# Patient Record
Sex: MALE | Race: BLACK OR AFRICAN AMERICAN | Employment: FULL TIME | URBAN - METROPOLITAN AREA
[De-identification: names, ages, dates, MRNs, and addresses within clinical notes are randomized per-mention and may not be internally consistent; named-entity substitution may affect disease eponyms.]

---

## 2017-01-11 ENCOUNTER — TELEPHONE (OUTPATIENT)
Dept: FAMILY MEDICINE CLINIC | Facility: CLINIC | Age: 52
End: 2017-01-11

## 2017-01-11 RX ORDER — AMLODIPINE BESYLATE 10 MG/1
10 TABLET ORAL DAILY
Qty: 30 TABLET | Refills: 1 | Status: SHIPPED | OUTPATIENT
Start: 2017-01-11 | End: 2017-03-01

## 2017-01-11 NOTE — TELEPHONE ENCOUNTER
Requesting Amlodipine refill    Noted CMP from [facility] ER visit 11/17/16 scanned into EMR; Creatinine 1.31  Prescription refilled per FM refill protocol.     Hypertensive Medications  Protocol Criteria:  · Appointment scheduled in the

## 2017-01-23 ENCOUNTER — OFFICE VISIT (OUTPATIENT)
Dept: GASTROENTEROLOGY | Facility: CLINIC | Age: 52
End: 2017-01-23

## 2017-01-23 ENCOUNTER — TELEPHONE (OUTPATIENT)
Dept: GASTROENTEROLOGY | Facility: CLINIC | Age: 52
End: 2017-01-23

## 2017-01-23 VITALS
DIASTOLIC BLOOD PRESSURE: 86 MMHG | SYSTOLIC BLOOD PRESSURE: 136 MMHG | WEIGHT: 194 LBS | BODY MASS INDEX: 27 KG/M2 | HEART RATE: 67 BPM

## 2017-01-23 DIAGNOSIS — Z12.11 ENCOUNTER FOR SCREENING COLONOSCOPY: Primary | ICD-10-CM

## 2017-01-23 PROCEDURE — 99241 OFFICE CONSULTATION,LEVEL I: CPT | Performed by: INTERNAL MEDICINE

## 2017-01-23 PROCEDURE — 99212 OFFICE O/P EST SF 10 MIN: CPT | Performed by: INTERNAL MEDICINE

## 2017-01-23 RX ORDER — ALLOPURINOL 300 MG/1
TABLET ORAL
COMMUNITY
Start: 2016-06-13 | End: 2017-11-07

## 2017-01-23 RX ORDER — LOSARTAN POTASSIUM 50 MG/1
TABLET ORAL
COMMUNITY
Start: 2016-06-13 | End: 2018-12-03

## 2017-01-23 RX ORDER — AMLODIPINE BESYLATE 10 MG/1
TABLET ORAL
COMMUNITY
Start: 2016-07-27 | End: 2018-05-08

## 2017-01-23 NOTE — TELEPHONE ENCOUNTER
Scheduled for:  Colonoscopy 65492  Date:  03/20/17  Location:  [facility]  Sedation:  MAC  Time:  12:30pm  Prep: SUPREP  Meds/Allergies Reconciled?:  NKA  Diagnosis with codes:  Colon cancer screening Z12.11  [facility] verification:

## 2017-01-23 NOTE — PROGRESS NOTES
HPI:    Patient ID: Arnaldo Horn is a 46year old man with history of hernia surgery who is referred to us by Dr. Jesika Marie for his first screening colonoscopy examination. He is seen today with his wife.     On review of systems, Mr Jerson Raygoza describes recent 2014.    Suggest:    1. Await labs ordered November 2016, including PSA which we discussed today. He comes in asking about prostate cancer screening. 2.  Agree with abdominal ultrasound exam as ordered November 2016.   I urged Mr Modesto Norris to go complete

## 2017-01-24 RX ORDER — DILTIAZEM HYDROCHLORIDE 180 MG/1
CAPSULE, COATED, EXTENDED RELEASE ORAL
Qty: 90 CAPSULE | Refills: 0 | Status: SHIPPED | OUTPATIENT
Start: 2017-01-24 | End: 2017-06-05

## 2017-01-30 ENCOUNTER — TELEPHONE (OUTPATIENT)
Dept: FAMILY MEDICINE CLINIC | Facility: CLINIC | Age: 52
End: 2017-01-30

## 2017-01-30 NOTE — TELEPHONE ENCOUNTER
During that visit I gave him a note to return on 11/23, does he need FMLA forms or just a copy of that letter?

## 2017-01-30 NOTE — TELEPHONE ENCOUNTER
Dr. Mariel Caldwell please see note below and advise. LOV with you 11/19/16 [facility].

## 2017-01-30 NOTE — TELEPHONE ENCOUNTER
Patient calling states that he needs to submit to his [employer] ( for [employer])   He was off  November 17 th and Nove 18 th   And can return on Nov 22 nd

## 2017-01-30 NOTE — TELEPHONE ENCOUNTER
Spoke with pt stts he already Has FMLA going. He would like a letter to be excused from work November 17 and November 18th. RTW November 22. Please advise. Letter that was written on OV stated may not rtw.

## 2017-01-31 NOTE — TELEPHONE ENCOUNTER
Spoke with pt informed of note below and he voiced understanding. Pt will  letter at [address] [facility] .

## 2017-03-01 ENCOUNTER — TELEPHONE (OUTPATIENT)
Dept: FAMILY MEDICINE CLINIC | Facility: CLINIC | Age: 52
End: 2017-03-01

## 2017-03-01 RX ORDER — AMLODIPINE BESYLATE 10 MG/1
10 TABLET ORAL DAILY
Qty: 90 TABLET | Refills: 0 | Status: SHIPPED | OUTPATIENT
Start: 2017-03-01 | End: 2017-11-07

## 2017-03-01 NOTE — TELEPHONE ENCOUNTER
Requesting 90-day Amlodipine refill    Noted scanned lab results from [facility] ER visit, Creatinine 1.31    Prescription refilled per FM refill protocol    Hypertensive Medications  Protocol Criteria:  · Appointment scheduled in the p

## 2017-03-20 ENCOUNTER — ANESTHESIA EVENT (OUTPATIENT)
Dept: ENDOSCOPY | Facility: HOSPITAL | Age: 52
End: 2017-03-20
Payer: COMMERCIAL

## 2017-03-20 ENCOUNTER — ANESTHESIA (OUTPATIENT)
Dept: ENDOSCOPY | Facility: HOSPITAL | Age: 52
End: 2017-03-20
Payer: COMMERCIAL

## 2017-03-20 ENCOUNTER — SURGERY (OUTPATIENT)
Age: 52
End: 2017-03-20

## 2017-03-20 ENCOUNTER — HOSPITAL ENCOUNTER (OUTPATIENT)
Facility: HOSPITAL | Age: 52
Setting detail: HOSPITAL OUTPATIENT SURGERY
Discharge: HOME | End: 2017-03-20
Attending: INTERNAL MEDICINE | Admitting: INTERNAL MEDICINE
Payer: COMMERCIAL

## 2017-03-20 VITALS
DIASTOLIC BLOOD PRESSURE: 91 MMHG | HEIGHT: 70 IN | HEART RATE: 53 BPM | WEIGHT: 191 LBS | SYSTOLIC BLOOD PRESSURE: 137 MMHG | TEMPERATURE: 98 F | BODY MASS INDEX: 27.35 KG/M2 | OXYGEN SATURATION: 100 % | RESPIRATION RATE: 17 BRPM

## 2017-03-20 DIAGNOSIS — K63.5 COLON POLYP: Primary | ICD-10-CM

## 2017-03-20 DIAGNOSIS — K64.9 HEMORRHOIDS, UNSPECIFIED HEMORRHOID TYPE: ICD-10-CM

## 2017-03-20 PROCEDURE — 45385 COLONOSCOPY W/LESION REMOVAL: CPT | Performed by: INTERNAL MEDICINE

## 2017-03-20 PROCEDURE — 0DBK8ZX EXCISION OF ASCENDING COLON, VIA NATURAL OR ARTIFICIAL OPENING ENDOSCOPIC, DIAGNOSTIC: ICD-10-PCS | Performed by: INTERNAL MEDICINE

## 2017-03-20 PROCEDURE — 0DBN8ZX EXCISION OF SIGMOID COLON, VIA NATURAL OR ARTIFICIAL OPENING ENDOSCOPIC, DIAGNOSTIC: ICD-10-PCS | Performed by: INTERNAL MEDICINE

## 2017-03-20 RX ORDER — LIDOCAINE HYDROCHLORIDE 10 MG/ML
INJECTION, SOLUTION EPIDURAL; INFILTRATION; INTRACAUDAL; PERINEURAL AS NEEDED
Status: DISCONTINUED | OUTPATIENT
Start: 2017-03-20 | End: 2017-03-20 | Stop reason: SURG

## 2017-03-20 RX ORDER — MIDAZOLAM HYDROCHLORIDE 1 MG/ML
1 INJECTION INTRAMUSCULAR; INTRAVENOUS EVERY 5 MIN PRN
Status: DISCONTINUED | OUTPATIENT
Start: 2017-03-20 | End: 2017-03-20

## 2017-03-20 RX ORDER — MIDAZOLAM HYDROCHLORIDE 1 MG/ML
INJECTION INTRAMUSCULAR; INTRAVENOUS AS NEEDED
Status: DISCONTINUED | OUTPATIENT
Start: 2017-03-20 | End: 2017-03-20 | Stop reason: SURG

## 2017-03-20 RX ORDER — SODIUM CHLORIDE 9 MG/ML
INJECTION, SOLUTION INTRAVENOUS CONTINUOUS
Status: DISCONTINUED | OUTPATIENT
Start: 2017-03-20 | End: 2017-03-20

## 2017-03-20 RX ORDER — SODIUM CHLORIDE, SODIUM LACTATE, POTASSIUM CHLORIDE, CALCIUM CHLORIDE 600; 310; 30; 20 MG/100ML; MG/100ML; MG/100ML; MG/100ML
INJECTION, SOLUTION INTRAVENOUS CONTINUOUS PRN
Status: DISCONTINUED | OUTPATIENT
Start: 2017-03-20 | End: 2017-03-20 | Stop reason: SURG

## 2017-03-20 RX ORDER — SODIUM CHLORIDE 0.9 % (FLUSH) 0.9 %
10 SYRINGE (ML) INJECTION AS NEEDED
Status: DISCONTINUED | OUTPATIENT
Start: 2017-03-20 | End: 2017-03-20

## 2017-03-20 RX ADMIN — MIDAZOLAM HYDROCHLORIDE 2 MG: 1 INJECTION INTRAMUSCULAR; INTRAVENOUS at 13:24:00

## 2017-03-20 RX ADMIN — SODIUM CHLORIDE, SODIUM LACTATE, POTASSIUM CHLORIDE, CALCIUM CHLORIDE: 600; 310; 30; 20 INJECTION, SOLUTION INTRAVENOUS at 13:35:00

## 2017-03-20 RX ADMIN — SODIUM CHLORIDE, SODIUM LACTATE, POTASSIUM CHLORIDE, CALCIUM CHLORIDE: 600; 310; 30; 20 INJECTION, SOLUTION INTRAVENOUS at 13:18:00

## 2017-03-20 RX ADMIN — LIDOCAINE HYDROCHLORIDE 50 MG: 10 INJECTION, SOLUTION EPIDURAL; INFILTRATION; INTRACAUDAL; PERINEURAL at 13:25:00

## 2017-03-20 NOTE — OPERATIVE REPORT
COLONOSCOPY PROCEDURE REPORT     DATE OF PROCEDURE:  3/20/2017     PCP: Ricci Moya DO     PREOPERATIVE DIAGNOSIS: Screening colonoscopy examination     POSTOPERATIVE DIAGNOSIS:  See impression. SURGEON:  GAMALIEL Velasquez aspirin or NSAID medications for next 10 days to prevent bleeding

## 2017-03-20 NOTE — ANESTHESIA PREPROCEDURE EVALUATION
Anesthesia PreOp Note    HPI:     Gretchen Arvizu is a 46year old male who presents for preoperative consultation requested by: Miah Hilton MD    Date of Surgery: 3/20/2017    Procedure(s):  COLONOSCOPY  Indication: Special screening for malign per week         Comment: 2 beers a day and possibly scotch    Drug Use: No    Sexual Activity: Not on file   Not on file  Other Topics Concern   None on file     Social History Narrative       Available pre-op labs reviewed. Vital Signs:   Body

## 2017-03-20 NOTE — H&P
History & Physical Examination    Patient Name: John Espinoza  MRN: [id number]  CSN: [id number]  YOB: 1965    Diagnosis: Screening colonoscopy examination    Present Illness:       Lesley Rivera MD at 1/23/2017  9:53 AM      Status: Date   • Essential hypertension    • High blood pressure          Past Surgical History    HERNIA SURGERY      FOOT SURGERY Left 1999    Comment archilles tendon torn    ELBOW SURGERY Right 2010    Comment right elbow ligament torn      Family History   Pr

## 2017-03-20 NOTE — ANESTHESIA POSTPROCEDURE EVALUATION
Patient: Ayden Gandhi    Procedure Summary     Date Anesthesia Start Anesthesia Stop Room / Location    03/20/17 5640 [address] [facility] ENDOSCOPY 01 / [facility] ENDOSCOPY       Procedure Diagnosis Surgeon Responsible Provider    COLONOSCOPY (N/A ) Special screening for m

## 2017-03-27 ENCOUNTER — TELEPHONE (OUTPATIENT)
Dept: FAMILY MEDICINE CLINIC | Facility: CLINIC | Age: 52
End: 2017-03-27

## 2017-03-27 NOTE — TELEPHONE ENCOUNTER
Actions Requested: pt asking for ortho referral  Situation/Background   Problem: pt states he feels like he has a stiff neck and has a headache.     Onset: 2-3 months ago   Associated Symptoms: Pt states his range of motion is limited, has difficulty turnin

## 2017-04-03 ENCOUNTER — TELEPHONE (OUTPATIENT)
Dept: GASTROENTEROLOGY | Facility: CLINIC | Age: 52
End: 2017-04-03

## 2017-04-03 NOTE — TELEPHONE ENCOUNTER
Message        GI RNs - 1. Please print and mail this letter to patient; 2. Recall for colonoscopy exam in 3 years     Results letter mailed to patient and colon recall entered for 3 years per CB.

## 2017-05-08 ENCOUNTER — TELEPHONE (OUTPATIENT)
Dept: FAMILY MEDICINE CLINIC | Facility: CLINIC | Age: 52
End: 2017-05-08

## 2017-05-08 ENCOUNTER — OFFICE VISIT (OUTPATIENT)
Dept: FAMILY MEDICINE CLINIC | Facility: CLINIC | Age: 52
End: 2017-05-08

## 2017-05-08 VITALS
TEMPERATURE: 98 F | HEART RATE: 59 BPM | SYSTOLIC BLOOD PRESSURE: 112 MMHG | DIASTOLIC BLOOD PRESSURE: 78 MMHG | HEIGHT: 71 IN | RESPIRATION RATE: 16 BRPM | BODY MASS INDEX: 26.32 KG/M2 | WEIGHT: 188 LBS

## 2017-05-08 DIAGNOSIS — M99.01 CERVICOTHORACIC SOMATIC DYSFUNCTION: Primary | ICD-10-CM

## 2017-05-08 DIAGNOSIS — R06.4 HYPERVENTILATION: ICD-10-CM

## 2017-05-08 DIAGNOSIS — I10 ESSENTIAL HYPERTENSION: ICD-10-CM

## 2017-05-08 DIAGNOSIS — G47.00 INSOMNIA, UNSPECIFIED TYPE: ICD-10-CM

## 2017-05-08 DIAGNOSIS — H53.9 VISUAL DISTURBANCE: ICD-10-CM

## 2017-05-08 PROCEDURE — 98927 OSTEOPATH MANJ 5-6 REGIONS: CPT | Performed by: FAMILY MEDICINE

## 2017-05-08 PROCEDURE — 99214 OFFICE O/P EST MOD 30 MIN: CPT | Performed by: FAMILY MEDICINE

## 2017-05-08 PROCEDURE — 99212 OFFICE O/P EST SF 10 MIN: CPT | Performed by: FAMILY MEDICINE

## 2017-05-08 RX ORDER — ESZOPICLONE 2 MG/1
2 TABLET, FILM COATED ORAL NIGHTLY
Qty: 30 TABLET | Refills: 1 | Status: SHIPPED | OUTPATIENT
Start: 2017-05-08 | End: 2017-07-12

## 2017-05-08 RX ORDER — ZOLPIDEM TARTRATE 10 MG/1
10 TABLET ORAL NIGHTLY PRN
COMMUNITY
Start: 2017-03-26 | End: 2017-07-12

## 2017-05-08 NOTE — TELEPHONE ENCOUNTER
FMLA forms papers from [employer] sent to [facility] inter office mail and fax to [facility], pt had an appt today 05 08 2017 w Dr Chino Awad,

## 2017-05-09 RX ORDER — METHYLPREDNISOLONE 4 MG/1
TABLET ORAL
Qty: 1 KIT | Refills: 0 | Status: SHIPPED | OUTPATIENT
Start: 2017-05-09 | End: 2017-07-12 | Stop reason: ALTCHOICE

## 2017-05-09 NOTE — PROGRESS NOTES
HPI:    Patient ID: Diana Allan is a 46year old male. Neck Pain   This is a chronic problem. The current episode started more than 1 month ago. The problem occurs constantly. The problem has been gradually worsening.  The pain is associated with an unk methylPREDNISolone (MEDROL) 4 MG Oral Tablet Therapy Pack As directed. Disp: 1 kit Rfl: 0   Zolpidem Tartrate 10 MG Oral Tab Take 10 mg by mouth nightly as needed. Disp:  Rfl:    Eszopiclone 2 MG Oral Tab Take 1 tablet (2 mg total) by mouth nightly.  Disp: Neurological: He is alert and oriented to person, place, and time. No cranial nerve deficit, sensory deficit or motor deficit. ASSESSMENT/PLAN:   1.  Cervicothoracic somatic dysfunction  Moderate relief after manipulation performed.  - OSTEOPAT

## 2017-05-15 ENCOUNTER — HOSPITAL ENCOUNTER (OUTPATIENT)
Dept: GENERAL RADIOLOGY | Age: 52
Discharge: HOME | End: 2017-05-15
Attending: FAMILY MEDICINE
Payer: COMMERCIAL

## 2017-05-15 ENCOUNTER — OFFICE VISIT (OUTPATIENT)
Dept: FAMILY MEDICINE CLINIC | Facility: CLINIC | Age: 52
End: 2017-05-15

## 2017-05-15 VITALS
SYSTOLIC BLOOD PRESSURE: 124 MMHG | DIASTOLIC BLOOD PRESSURE: 78 MMHG | TEMPERATURE: 99 F | HEART RATE: 90 BPM | HEIGHT: 71 IN | RESPIRATION RATE: 16 BRPM | WEIGHT: 185 LBS | BODY MASS INDEX: 25.9 KG/M2

## 2017-05-15 DIAGNOSIS — R07.89 CHEST WALL DISCOMFORT: ICD-10-CM

## 2017-05-15 DIAGNOSIS — M99.01 CERVICOTHORACIC SOMATIC DYSFUNCTION: ICD-10-CM

## 2017-05-15 DIAGNOSIS — I10 ESSENTIAL HYPERTENSION: ICD-10-CM

## 2017-05-15 DIAGNOSIS — M54.2 CERVICAL PAIN (NECK): ICD-10-CM

## 2017-05-15 DIAGNOSIS — M54.2 CERVICAL PAIN (NECK): Primary | ICD-10-CM

## 2017-05-15 PROCEDURE — 98927 OSTEOPATH MANJ 5-6 REGIONS: CPT | Performed by: FAMILY MEDICINE

## 2017-05-15 PROCEDURE — 99212 OFFICE O/P EST SF 10 MIN: CPT | Performed by: FAMILY MEDICINE

## 2017-05-15 PROCEDURE — 72050 X-RAY EXAM NECK SPINE 4/5VWS: CPT | Performed by: FAMILY MEDICINE

## 2017-05-15 PROCEDURE — 99214 OFFICE O/P EST MOD 30 MIN: CPT | Performed by: FAMILY MEDICINE

## 2017-05-15 NOTE — PROGRESS NOTES
HPI:    Patient ID: Davina Pedro is a 46year old male. Neck Pain   This is a new problem. The current episode started 1 to 4 weeks ago. The problem has been unchanged. The pain is associated with an unknown factor.  The pain is present in the occipital Musculoskeletal: Positive for neck pain. Current Outpatient Prescriptions:  methylPREDNISolone (MEDROL) 4 MG Oral Tablet Therapy Pack As directed. Disp: 1 kit Rfl: 0   Zolpidem Tartrate 10 MG Oral Tab Take 10 mg by mouth nightly as needed.  Dis OSTEOPATHIC MANIP,5-6 BODY REGN      Orders Placed This Encounter  OSTEOPATHIC MANIP,5-6 BODY REGN    Meds This Visit:  No prescriptions requested or ordered in this encounter    Imaging & Referrals:  XR CERVICAL SPINE (4VIEWS) (CPT=72050)  Patient Instruc

## 2017-05-15 NOTE — PATIENT INSTRUCTIONS
Comply with medications. OMT done. Monitor blood pressures and record at home. Limit salt intake. Recommend weight loss via daily exercising and consistent healthy dietary changes.

## 2017-05-16 ENCOUNTER — TELEPHONE (OUTPATIENT)
Dept: FAMILY MEDICINE CLINIC | Facility: CLINIC | Age: 52
End: 2017-05-16

## 2017-05-16 NOTE — TELEPHONE ENCOUNTER
----- Message from Samantha Cohen DO sent at 5/15/2017  8:26 PM CDT -----  Arthritis noted in neck and some narrowing of nerve tunnels to the right.  Recommend ortho/spine for further evaluation if symptoms persist.

## 2017-06-06 RX ORDER — DILTIAZEM HYDROCHLORIDE 180 MG/1
CAPSULE, COATED, EXTENDED RELEASE ORAL
Qty: 90 CAPSULE | Refills: 0 | Status: SHIPPED | OUTPATIENT
Start: 2017-06-06 | End: 2017-09-07

## 2017-07-11 ENCOUNTER — TELEPHONE (OUTPATIENT)
Dept: FAMILY MEDICINE CLINIC | Facility: CLINIC | Age: 52
End: 2017-07-11

## 2017-07-11 NOTE — TELEPHONE ENCOUNTER
Actions Requested: pt asking to schedule appt with Dr. Bhupendra Rapp. States he has been feeling a sensation in his stomach and then has difficulty breathing and then he gets chest pain. States this may last for several hours.  Pt states he did see the doctor fo verbalizes understanding and agrees with plan.

## 2017-07-12 ENCOUNTER — LAB ENCOUNTER (OUTPATIENT)
Dept: LAB | Age: 52
End: 2017-07-12
Attending: FAMILY MEDICINE
Payer: COMMERCIAL

## 2017-07-12 ENCOUNTER — OFFICE VISIT (OUTPATIENT)
Dept: FAMILY MEDICINE CLINIC | Facility: CLINIC | Age: 52
End: 2017-07-12

## 2017-07-12 ENCOUNTER — HOSPITAL ENCOUNTER (OUTPATIENT)
Dept: GENERAL RADIOLOGY | Age: 52
Discharge: HOME | End: 2017-07-12
Attending: FAMILY MEDICINE
Payer: COMMERCIAL

## 2017-07-12 VITALS
HEART RATE: 75 BPM | OXYGEN SATURATION: 97 % | SYSTOLIC BLOOD PRESSURE: 111 MMHG | HEIGHT: 71 IN | RESPIRATION RATE: 16 BRPM | DIASTOLIC BLOOD PRESSURE: 81 MMHG | TEMPERATURE: 99 F | BODY MASS INDEX: 25.9 KG/M2 | WEIGHT: 185 LBS

## 2017-07-12 DIAGNOSIS — R35.0 URINARY FREQUENCY: ICD-10-CM

## 2017-07-12 DIAGNOSIS — R07.9 CHEST PAIN, UNSPECIFIED TYPE: Primary | ICD-10-CM

## 2017-07-12 DIAGNOSIS — R07.9 CHEST PAIN, UNSPECIFIED TYPE: ICD-10-CM

## 2017-07-12 DIAGNOSIS — G47.00 INSOMNIA, UNSPECIFIED TYPE: ICD-10-CM

## 2017-07-12 DIAGNOSIS — R06.02 SHORTNESS OF BREATH: ICD-10-CM

## 2017-07-12 LAB
BASOPHILS # BLD: 0.1 K/UL (ref 0–0.2)
BASOPHILS NFR BLD: 1 %
D DIMER PPP FEU-MCNC: <0.27 MCG/ML (ref ?–0.51)
EOSINOPHIL # BLD: 0.3 K/UL (ref 0–0.7)
EOSINOPHIL NFR BLD: 5 %
ERYTHROCYTE [DISTWIDTH] IN BLOOD BY AUTOMATED COUNT: 14.3 % (ref 11–15)
HCT VFR BLD AUTO: 49.2 % (ref 41–52)
HGB BLD-MCNC: 16.6 G/DL (ref 13.5–17.5)
LYMPHOCYTES # BLD: 1.8 K/UL (ref 1–4)
LYMPHOCYTES NFR BLD: 34 %
MCH RBC QN AUTO: 31 PG (ref 27–32)
MCHC RBC AUTO-ENTMCNC: 33.8 G/DL (ref 32–37)
MCV RBC AUTO: 91.7 FL (ref 80–100)
MONOCYTES # BLD: 0.7 K/UL (ref 0–1)
MONOCYTES NFR BLD: 14 %
NEUTROPHILS # BLD AUTO: 2.3 K/UL (ref 1.8–7.7)
NEUTROPHILS NFR BLD: 45 %
PLATELET # BLD AUTO: 126 K/UL (ref 140–400)
PMV BLD AUTO: 9.3 FL (ref 7.4–10.3)
RBC # BLD AUTO: 5.37 M/UL (ref 4.5–5.9)
WBC # BLD AUTO: 5.1 K/UL (ref 4–11)

## 2017-07-12 PROCEDURE — 85025 COMPLETE CBC W/AUTO DIFF WBC: CPT

## 2017-07-12 PROCEDURE — 36415 COLL VENOUS BLD VENIPUNCTURE: CPT

## 2017-07-12 PROCEDURE — 83036 HEMOGLOBIN GLYCOSYLATED A1C: CPT

## 2017-07-12 PROCEDURE — 99212 OFFICE O/P EST SF 10 MIN: CPT | Performed by: FAMILY MEDICINE

## 2017-07-12 PROCEDURE — 71010 XR CHEST AP/PA (1 VIEW) (CPT=71010): CPT | Performed by: FAMILY MEDICINE

## 2017-07-12 PROCEDURE — 85379 FIBRIN DEGRADATION QUANT: CPT

## 2017-07-12 PROCEDURE — 87086 URINE CULTURE/COLONY COUNT: CPT

## 2017-07-12 PROCEDURE — 99214 OFFICE O/P EST MOD 30 MIN: CPT | Performed by: FAMILY MEDICINE

## 2017-07-12 RX ORDER — ZOLPIDEM TARTRATE 10 MG/1
10 TABLET ORAL NIGHTLY PRN
Qty: 90 TABLET | Refills: 0 | Status: SHIPPED | OUTPATIENT
Start: 2017-07-12 | End: 2017-11-07

## 2017-07-12 NOTE — PROGRESS NOTES
HPI:    Arnaldo Horn is a 46year old male presents to clinic with a 2 week history of intermittent CP, SOB, and dizziness.  States that he initially thought it was heart burn which he has had in the past so he improved his diet and took antacids, this di tablet (10 mg total) by mouth nightly as needed.  Disp: 90 tablet Rfl: 0   DILTIAZEM HCL ER COATED BEADS 180 MG Oral Capsule SR 24 Hr TAKE 1 CAPSULE BY MOUTH DAILY, Disp: 90 capsule Rfl: 0   AmLODIPine Besylate 10 MG Oral Tab Take 1 tablet (10 mg total) by Psychiatric: Affect normal.   Vitals reviewed.     ASSESSMENT/PLAN:   Chest pain, unspecified type  (primary encounter diagnosis)  Shortness of breath  - symptoms sound like patient has anxiety, he would like to rule out all medical causes of this first

## 2017-07-13 LAB — HBA1C MFR BLD: 5.8 % (ref 4–6)

## 2017-07-17 ENCOUNTER — TELEPHONE (OUTPATIENT)
Dept: FAMILY MEDICINE CLINIC | Facility: CLINIC | Age: 52
End: 2017-07-17

## 2017-07-17 NOTE — TELEPHONE ENCOUNTER
Called patient to inform him of results. He did not answer so a VM was left asking him to call back, when he does, please inform him of the following:  Normal CXR, CBC, and neg Ddimer. Also Urine Culture was negative.  He is prediabetic, which explains his

## 2017-07-18 NOTE — TELEPHONE ENCOUNTER
Call transferred to RN from [address] [place] [address]. Pt had further questions about his lab results, reviewed doctor's note with pt. Per his request diet information from Eduar Martínez mailed to home address on file.  Advised pt to avoid sugary drinks including energy drinks and to r

## 2017-07-18 NOTE — TELEPHONE ENCOUNTER
Verified pt name and . Reviewed test results and recommendations with pt per doctor's instructions. Pt agreed with plan of care. No appt available this week, only same day appt on Saturday.     Please advise

## 2017-07-22 PROBLEM — F41.9 ANXIETY: Status: ACTIVE | Noted: 2017-07-22

## 2017-07-22 NOTE — PATIENT INSTRUCTIONS
Consider Dr Valetne Shaffer. Consider low dose ativan (0.5 mg) orally as needed. Consider GI work up.

## 2017-07-22 NOTE — PROGRESS NOTES
HPI:    Patient ID: Desiree Abrams is a 46year old male. Anxiety   This is a chronic problem. The current episode started more than 1 month ago. The problem occurs intermittently. The problem has been waxing and waning.  The symptoms are aggravated by str Anxiety  Recommend Dr. Ad Steele in psychotherapy. No orders of the defined types were placed in this encounter.       Meds This Visit:  No prescriptions requested or ordered in this encounter    Imaging & Referrals:  None  Patient Instructions   Consider

## 2017-09-08 RX ORDER — DILTIAZEM HYDROCHLORIDE 180 MG/1
CAPSULE, COATED, EXTENDED RELEASE ORAL
Qty: 90 CAPSULE | Refills: 0 | Status: SHIPPED | OUTPATIENT
Start: 2017-09-08 | End: 2018-12-03

## 2017-09-11 RX ORDER — ALLOPURINOL 300 MG/1
TABLET ORAL
Qty: 90 TABLET | Refills: 0 | Status: SHIPPED | OUTPATIENT
Start: 2017-09-11 | End: 2017-12-08

## 2017-10-24 RX ORDER — AMLODIPINE BESYLATE 10 MG/1
10 TABLET ORAL DAILY
Qty: 90 TABLET | Refills: 0 | Status: SHIPPED | OUTPATIENT
Start: 2017-10-24 | End: 2017-11-07

## 2017-11-07 ENCOUNTER — TELEPHONE (OUTPATIENT)
Dept: FAMILY MEDICINE CLINIC | Facility: CLINIC | Age: 52
End: 2017-11-07

## 2017-11-07 ENCOUNTER — OFFICE VISIT (OUTPATIENT)
Dept: FAMILY MEDICINE CLINIC | Facility: CLINIC | Age: 52
End: 2017-11-07

## 2017-11-07 VITALS
HEART RATE: 63 BPM | WEIGHT: 185.81 LBS | BODY MASS INDEX: 26.01 KG/M2 | TEMPERATURE: 99 F | HEIGHT: 71 IN | RESPIRATION RATE: 18 BRPM | SYSTOLIC BLOOD PRESSURE: 110 MMHG | DIASTOLIC BLOOD PRESSURE: 80 MMHG

## 2017-11-07 DIAGNOSIS — F41.9 ANXIETY: ICD-10-CM

## 2017-11-07 DIAGNOSIS — M54.2 CERVICAL PAIN (NECK): ICD-10-CM

## 2017-11-07 DIAGNOSIS — I10 ESSENTIAL HYPERTENSION: Primary | ICD-10-CM

## 2017-11-07 DIAGNOSIS — M10.9 GOUT, UNSPECIFIED CAUSE, UNSPECIFIED CHRONICITY, UNSPECIFIED SITE: ICD-10-CM

## 2017-11-07 PROCEDURE — 99212 OFFICE O/P EST SF 10 MIN: CPT | Performed by: FAMILY MEDICINE

## 2017-11-07 PROCEDURE — 99214 OFFICE O/P EST MOD 30 MIN: CPT | Performed by: FAMILY MEDICINE

## 2017-11-07 RX ORDER — ALPRAZOLAM 0.5 MG/1
0.5 TABLET ORAL 2 TIMES DAILY PRN
Qty: 60 TABLET | Refills: 0 | Status: SHIPPED | OUTPATIENT
Start: 2017-11-07 | End: 2018-12-03 | Stop reason: DRUGHIGH

## 2017-11-07 RX ORDER — ZOLPIDEM TARTRATE 10 MG/1
10 TABLET ORAL NIGHTLY PRN
Qty: 90 TABLET | Refills: 1 | Status: SHIPPED | OUTPATIENT
Start: 2017-11-07 | End: 2018-04-16

## 2017-11-07 NOTE — TELEPHONE ENCOUNTER
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 Logged for processing. Pt paid $25 with .  NK

## 2017-11-07 NOTE — PROGRESS NOTES
HPI:    Patient ID: Giorgio Bradford is a 46year old male. LA illness status update needed as well regarding gout. Hypertension   This is a chronic problem. The current episode started more than 1 year ago.  The problem has been gradually improving s 24 Hr TAKE ONE CAPSULE BY MOUTH EVERY DAY Disp: 90 capsule Rfl: 0   LORazepam (ATIVAN) 0.5 MG Oral Tab Take 1 tablet (0.5 mg total) by mouth 2 (two) times daily.  Disp: 60 tablet Rfl: 0   Zolpidem Tartrate 10 MG Oral Tab Take 1 tablet (10 mg total) by mouth blood pressures and record at home. Limit salt intake. Comply with medications. Recommend daily exercising and consistent healthy dietary changes. Medication reviewed and renewed where needed and appropriate.       Return in about 3 months (around 2/7/20

## 2017-11-07 NOTE — PATIENT INSTRUCTIONS
Monitor blood pressures and record at home. Limit salt intake. Comply with medications. Recommend daily exercising and consistent healthy dietary changes. Medication reviewed and renewed where needed and appropriate.

## 2017-11-07 NOTE — TELEPHONE ENCOUNTER
Pt had an appt today with Dr. Pradeep Hughes. FMLA forms, signed [employer] form and Form Completion Request emailed to the forms department.

## 2017-11-15 NOTE — TELEPHONE ENCOUNTER
Dr. Kyung Hunt,    Please sign off on form:  -Highlight the patient and hit \"Chart\" button. -In Chart Review, w/in the Encounter tab - open the Telephone call encounter for 11/07/17. Scroll down.  -Click \"scan on\" blue Hyperlink under \"Media\" heading for  PP[employer] FMLA Dr. Kyung Hunt 62/66/78 .  -Click on Acknowledge button and left-click onto image, signature stamp appears and drag signature to Provider signature line. Stamp will turn blue. Close window.     Thank you,  Guillermina Cover dept

## 2017-11-17 NOTE — TELEPHONE ENCOUNTER
Form completed and fxd and mld to Baker Sawyer [facility],  Copy mld to pt.   Pt paid fee BARBY

## 2017-12-08 RX ORDER — ALLOPURINOL 300 MG/1
TABLET ORAL
Qty: 90 TABLET | Refills: 0 | Status: SHIPPED | OUTPATIENT
Start: 2017-12-08 | End: 2018-03-31

## 2018-01-22 RX ORDER — AMLODIPINE BESYLATE 10 MG/1
TABLET ORAL
Qty: 90 TABLET | Refills: 0 | Status: SHIPPED | OUTPATIENT
Start: 2018-01-22 | End: 2018-07-15

## 2018-01-22 NOTE — TELEPHONE ENCOUNTER
Refill protocol failed, labs out of range.   FJR please advise on refill request.    Hypertensive Medications  Protocol Criteria:  · Appointment scheduled in the past 6 months or in the next 3 months  · BMP or CMP in the past 12 months  · Creatinine result

## 2018-03-31 RX ORDER — ALLOPURINOL 300 MG/1
TABLET ORAL
Qty: 90 TABLET | Refills: 0 | Status: SHIPPED | OUTPATIENT
Start: 2018-03-31 | End: 2018-06-30

## 2018-03-31 NOTE — TELEPHONE ENCOUNTER
LOV: 11/7/17 LAst Rx: 12/8/17    No protocol     Please advise in regards to refill request. Thank You

## 2018-04-16 NOTE — TELEPHONE ENCOUNTER
Please advise on refill request.     Recent Outpatient Visits            5 months ago Essential hypertension    [facility], [facility], [address], [place], [place] Phelps,     Office Visit    8 months ago [facility], [place]

## 2018-04-17 RX ORDER — ZOLPIDEM TARTRATE 10 MG/1
TABLET ORAL
Qty: 90 TABLET | Refills: 1 | OUTPATIENT
Start: 2018-04-17 | End: 2018-11-20

## 2018-04-18 ENCOUNTER — TELEPHONE (OUTPATIENT)
Dept: FAMILY MEDICINE CLINIC | Facility: CLINIC | Age: 53
End: 2018-04-18

## 2018-04-18 NOTE — TELEPHONE ENCOUNTER
Patient made an appointment with Dr. Landon Powell on Friday, April 20th at 10:30am for FMLA papers to be completed. Dr. Landon Powell is not the patient's PCP and she is not comfortable completing these forms. Please call patient and reschedule with Dr. Tricia Duran.

## 2018-04-18 NOTE — TELEPHONE ENCOUNTER
Patient needs Appointment to have FMLA paperwork filled out and needs to see Dr. Austin Vega due to had appointment with Dr. Yanet Rodriguez but she does not feel comfortable completing paperwork.      Can this patient be double booked on Friday 04/20/2018    Please

## 2018-04-19 NOTE — TELEPHONE ENCOUNTER
Appt was made for the next available date May 8th. Patient was transferred to the forms department regarding his FMLA paperwork.

## 2018-04-21 RX ORDER — ZOLPIDEM TARTRATE 10 MG/1
TABLET ORAL
Qty: 90 TABLET | Refills: 1 | OUTPATIENT
Start: 2018-04-21

## 2018-04-21 NOTE — TELEPHONE ENCOUNTER
Authorizing Provider Encounter Provider   Ruth Thomas, DO Ruth Thomas, DO   Medication Detail     Medication Quantity Refills Start End   ZOLPIDEM TARTRATE 10 MG Oral Tab 90 tablet 1 4/17/2018    Sig :  TAKE 1 TABLET BY MOUTH NIGHTLY AS NEE

## 2018-04-21 NOTE — TELEPHONE ENCOUNTER
Spoke with pharmacy and was informed that the patient is not eligible for refill until May 6, 2018. Informed Rx request came through to us electronically through [address] [place], [place] [address]way not requested by patient. Verbalized understanding.      No further questions or

## 2018-05-08 NOTE — PATIENT INSTRUCTIONS
Medication reviewed and renewed where needed and appropriate. Comply with medications. Monitor blood pressures and record at home. Limit salt intake. Recommend daily exercising and consistent healthy dietary changes. FMLA update regarding gout.

## 2018-05-08 NOTE — PROGRESS NOTES
HPI:    Patient ID: Espinoza Avelar is a 46year old male. Gout   This is a chronic problem. The current episode started more than 1 year ago. The problem occurs constantly. The problem has been unchanged. Pertinent negatives include no headaches.  Sulaiman Dominguez 180 MG Oral Capsule SR 24 Hr TAKE ONE CAPSULE BY MOUTH EVERY DAY Disp: 90 capsule Rfl: 0   LORazepam (ATIVAN) 0.5 MG Oral Tab Take 1 tablet (0.5 mg total) by mouth 2 (two) times daily.  Disp: 60 tablet Rfl: 0   colchicine 0.6 MG Oral Tab Take 1 tablet (0.6 changes. FMLA update regarding gout. Return in about 3 months (around 8/8/2018), or if symptoms worsen or fail to improve.          DM#[id number]

## 2018-05-09 ENCOUNTER — TELEPHONE (OUTPATIENT)
Dept: ADMINISTRATIVE | Age: 53
End: 2018-05-09

## 2018-05-09 NOTE — TELEPHONE ENCOUNTER
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 Logged for processing.  NK

## 2018-05-09 NOTE — TELEPHONE ENCOUNTER
Dr. Feliciano Gandhi,     Re certification LA (1 to 3 days per mo)   #1. Gout  #2. Hypertension H/A's       Please sign off on form:  -Highlight the patient and hit \"Chart\" button.   -In Chart Review, w/in the Encounter tab - click 1 time on the Telephone zuleyka

## 2018-06-29 ENCOUNTER — TELEPHONE (OUTPATIENT)
Dept: OTHER | Age: 53
End: 2018-06-29

## 2018-06-29 NOTE — TELEPHONE ENCOUNTER
Dr Kathrin Duran, please advise. Patient experienced flare up of his right ankle on Tuesday 6/26/18, still painful, using crutches.  He needs a note to excuse him from work on Saturday 6/30/18 as it is one day outside of his 3-day flare on his FMLA with [employer]

## 2018-07-02 RX ORDER — ALLOPURINOL 300 MG/1
TABLET ORAL
Qty: 90 TABLET | Refills: 0 | Status: SHIPPED | OUTPATIENT
Start: 2018-07-02 | End: 2020-08-11 | Stop reason: ALTCHOICE

## 2018-07-16 RX ORDER — AMLODIPINE BESYLATE 10 MG/1
TABLET ORAL
Qty: 90 TABLET | Refills: 0 | Status: SHIPPED | OUTPATIENT
Start: 2018-07-16 | End: 2018-12-03

## 2018-11-20 ENCOUNTER — TELEPHONE (OUTPATIENT)
Dept: FAMILY MEDICINE CLINIC | Facility: CLINIC | Age: 53
End: 2018-11-20

## 2018-11-20 NOTE — TELEPHONE ENCOUNTER
Charley Calderon needs a refill of:      ALPRAZolam (XANAX) 1 MG Oral Tab Take 1 tablet (1 mg total) by mouth nightly as needed for Sleep.  Disp: 60 tablet Rfl: 1   ZOLPIDEM TARTRATE 10 MG Oral Tab TAKE 1 TABLET BY MOUTH NIGHTLY AS NEEDED Disp: 90 tablet Rfl: 1

## 2018-11-20 NOTE — TELEPHONE ENCOUNTER
Please advise on refill request.     Refill Protocol Appointment Criteria  · Appointment scheduled in the past 6 months or in the next 3 months  Recent Outpatient Visits            6 months ago [address], [place], [place]

## 2018-11-20 NOTE — TELEPHONE ENCOUNTER
Fina Pearson stopped in the [facility] office, dropped off 2 sets of FMLA forms for recertification, one for hypertension, one for gout. Both sets of FMLA forms, completed HIPAA form and payment form (pt paid $41 for recertification) emailed to [email or web address], original forms left in the [facility] office for [employer].

## 2018-11-21 RX ORDER — ALPRAZOLAM 1 MG/1
1 TABLET ORAL NIGHTLY PRN
Qty: 60 TABLET | Refills: 1 | Status: SHIPPED
Start: 2018-11-21 | End: 2018-12-03

## 2018-11-21 RX ORDER — ZOLPIDEM TARTRATE 10 MG/1
TABLET ORAL
Qty: 90 TABLET | Refills: 1 | Status: SHIPPED
Start: 2018-11-21 | End: 2018-12-03

## 2018-11-21 NOTE — TELEPHONE ENCOUNTER
[facility], pharmacy called. Spoke to Dari, she called to confirm our office faxed over alprazolam and zolpidem. Confirmed RX.

## 2018-11-21 NOTE — TELEPHONE ENCOUNTER
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 Logged for processing.  NK

## 2018-11-27 NOTE — TELEPHONE ENCOUNTER
Forms (FMLA reserts  X 2)  completed signed b y Dr. Madiha Johnson and faxed.   Origs mld to pt he did pay $15.00 however he requested for the 2 dx to be  so I billed for form #2 $15.00 BARBY       FMLA #1  Hypertension H/A's   FMLA #2  Gout     Dr. Madiha Johnson

## 2018-12-03 ENCOUNTER — OFFICE VISIT (OUTPATIENT)
Dept: FAMILY MEDICINE CLINIC | Facility: CLINIC | Age: 53
End: 2018-12-03
Payer: COMMERCIAL

## 2018-12-03 VITALS
SYSTOLIC BLOOD PRESSURE: 134 MMHG | WEIGHT: 192 LBS | DIASTOLIC BLOOD PRESSURE: 83 MMHG | BODY MASS INDEX: 26.88 KG/M2 | HEART RATE: 88 BPM | TEMPERATURE: 98 F | RESPIRATION RATE: 16 BRPM | HEIGHT: 71 IN

## 2018-12-03 DIAGNOSIS — M1A.9XX0 CHRONIC GOUT WITHOUT TOPHUS, UNSPECIFIED CAUSE, UNSPECIFIED SITE: ICD-10-CM

## 2018-12-03 DIAGNOSIS — F41.9 ANXIETY: ICD-10-CM

## 2018-12-03 DIAGNOSIS — Z00.00 ANNUAL PHYSICAL EXAM: Primary | ICD-10-CM

## 2018-12-03 DIAGNOSIS — I10 ESSENTIAL HYPERTENSION: ICD-10-CM

## 2018-12-03 PROCEDURE — 99396 PREV VISIT EST AGE 40-64: CPT | Performed by: FAMILY MEDICINE

## 2018-12-03 RX ORDER — AMLODIPINE BESYLATE 10 MG/1
10 TABLET ORAL
Qty: 90 TABLET | Refills: 1 | Status: SHIPPED | OUTPATIENT
Start: 2018-12-03 | End: 2020-03-28

## 2018-12-03 RX ORDER — ZOLPIDEM TARTRATE 10 MG/1
TABLET ORAL
Qty: 90 TABLET | Refills: 1 | Status: SHIPPED | OUTPATIENT
Start: 2018-12-03 | End: 2019-06-27

## 2018-12-03 RX ORDER — DILTIAZEM HYDROCHLORIDE 180 MG/1
180 CAPSULE, COATED, EXTENDED RELEASE ORAL
Qty: 90 CAPSULE | Refills: 1 | Status: SHIPPED | OUTPATIENT
Start: 2018-12-03 | End: 2019-06-04

## 2018-12-03 RX ORDER — ALPRAZOLAM 1 MG/1
1 TABLET ORAL NIGHTLY PRN
Qty: 60 TABLET | Refills: 1 | Status: SHIPPED | OUTPATIENT
Start: 2018-12-03 | End: 2019-05-21

## 2018-12-03 NOTE — PROGRESS NOTES
HPI:    Cassandra Núñez is a 48year old male presents to clinic for an annual physical exam.   Denies any concerns or major changes. Is still experiencing symptoms of intermittent anxiety, notes that Xanax helps.   Has not been able to establish with a the MOUTH EVERY DAY Disp: 90 tablet Rfl: 0   ALLOPURINOL 300 MG Oral Tab TAKE 1 TABLET BY MOUTH EVERY DAY Disp: 90 tablet Rfl: 0   DILTIAZEM HCL ER COATED BEADS 180 MG Oral Capsule SR 24 Hr TAKE ONE CAPSULE BY MOUTH EVERY DAY Disp: 90 capsule Rfl: 0   colchici METABOLIC PANEL (14), TSH W REFLEX TO FREE T4,         LIPID PANEL, HEMOGLOBIN A1C, PSA  - Immunizations UTD, declined flu vaccine. - Smoking cessation advised. - Reinforced healthy diet, lifestyle, and exercise.   - Dentist visits Q6 months advised

## 2018-12-04 ENCOUNTER — TELEPHONE (OUTPATIENT)
Dept: FAMILY MEDICINE CLINIC | Facility: CLINIC | Age: 53
End: 2018-12-04

## 2018-12-04 DIAGNOSIS — D75.1 POLYCYTHEMIA: Primary | ICD-10-CM

## 2018-12-04 NOTE — TELEPHONE ENCOUNTER
Spoke to patient this AM regarding results. Mild elevation of AST, will repeat at next visit. Also, triglycerides are high, patient was not fasting, ate pizza prior to appt.  Will repeat fasting, I did encourage patient to clean up his diet and exercise mor

## 2018-12-10 ENCOUNTER — OFFICE VISIT (OUTPATIENT)
Dept: HEMATOLOGY/ONCOLOGY | Facility: HOSPITAL | Age: 53
End: 2018-12-10
Attending: INTERNAL MEDICINE
Payer: COMMERCIAL

## 2018-12-10 VITALS
WEIGHT: 189 LBS | DIASTOLIC BLOOD PRESSURE: 80 MMHG | SYSTOLIC BLOOD PRESSURE: 141 MMHG | HEIGHT: 71 IN | RESPIRATION RATE: 16 BRPM | BODY MASS INDEX: 26.46 KG/M2 | TEMPERATURE: 99 F | HEART RATE: 83 BPM

## 2018-12-10 DIAGNOSIS — D75.1 ERYTHROCYTOSIS: Primary | ICD-10-CM

## 2018-12-10 PROCEDURE — 99244 OFF/OP CNSLTJ NEW/EST MOD 40: CPT | Performed by: INTERNAL MEDICINE

## 2018-12-10 NOTE — CONSULTS
[facility]    PATIENT'S NAME: EYAD Muhammad   CONSULTING PHYSICIAN: Jeffrey Hoover.  Esperanza Walters MD   PATIENT ACCOUNT #: [de-identified] LOCATION: [address] [place] [address] RECORD #: [id number] YOB: 1965   CONSULTATION DATE: 12/10/2018       CANCER CENTER nontender, nondistended. No masses. No organomegaly. EXTREMITIES:  No edema. NEUROLOGIC:  Moves all extremities. Cranial nerves intact. SKIN:  No palpable lesions. LYMPHATICS:  No palpable peripheral adenopathy on survey.   PSYCHIATRIC:  Appropriat

## 2019-01-14 ENCOUNTER — LAB ENCOUNTER (OUTPATIENT)
Dept: LAB | Age: 54
End: 2019-01-14
Attending: INTERNAL MEDICINE
Payer: COMMERCIAL

## 2019-01-14 DIAGNOSIS — D75.1 ERYTHROCYTOSIS: ICD-10-CM

## 2019-01-14 LAB
BASOPHILS # BLD: 0.1 K/UL (ref 0–0.2)
BASOPHILS NFR BLD: 1 %
EOSINOPHIL # BLD: 0.5 K/UL (ref 0–0.7)
EOSINOPHIL NFR BLD: 8 %
ERYTHROCYTE [DISTWIDTH] IN BLOOD BY AUTOMATED COUNT: 14.1 % (ref 11–15)
HCT VFR BLD AUTO: 48.6 % (ref 41–52)
HGB BLD-MCNC: 16.5 G/DL (ref 13.5–17.5)
LYMPHOCYTES # BLD: 2.2 K/UL (ref 1–4)
LYMPHOCYTES NFR BLD: 37 %
MCH RBC QN AUTO: 31.1 PG (ref 27–32)
MCHC RBC AUTO-ENTMCNC: 33.9 G/DL (ref 32–37)
MCV RBC AUTO: 91.8 FL (ref 80–100)
MONOCYTES # BLD: 0.8 K/UL (ref 0–1)
MONOCYTES NFR BLD: 13 %
NEUTROPHILS # BLD AUTO: 2.4 K/UL (ref 1.8–7.7)
NEUTROPHILS NFR BLD: 40 %
PLATELET # BLD AUTO: 128 K/UL (ref 140–400)
PMV BLD AUTO: 8.8 FL (ref 7.4–10.3)
RBC # BLD AUTO: 5.29 M/UL (ref 4.5–5.9)
WBC # BLD AUTO: 5.9 K/UL (ref 4–11)

## 2019-01-14 PROCEDURE — 85025 COMPLETE CBC W/AUTO DIFF WBC: CPT

## 2019-01-14 PROCEDURE — 36415 COLL VENOUS BLD VENIPUNCTURE: CPT

## 2019-01-14 PROCEDURE — 82668 ASSAY OF ERYTHROPOIETIN: CPT

## 2019-01-17 LAB — ERYTHROPOIETIN (EPO): 6 MU/ML

## 2019-01-21 ENCOUNTER — APPOINTMENT (OUTPATIENT)
Dept: HEMATOLOGY/ONCOLOGY | Facility: HOSPITAL | Age: 54
End: 2019-01-21
Attending: INTERNAL MEDICINE
Payer: COMMERCIAL

## 2019-01-28 ENCOUNTER — TELEPHONE (OUTPATIENT)
Dept: HEMATOLOGY/ONCOLOGY | Facility: HOSPITAL | Age: 54
End: 2019-01-28

## 2019-01-28 ENCOUNTER — APPOINTMENT (OUTPATIENT)
Dept: HEMATOLOGY/ONCOLOGY | Facility: HOSPITAL | Age: 54
End: 2019-01-28
Attending: INTERNAL MEDICINE
Payer: COMMERCIAL

## 2019-01-28 NOTE — TELEPHONE ENCOUNTER
Called Gamaliel back, updated him that Dr Miko Jc reviewed his labs and his CBC has improved on it's own, and that the other lab result came back normal.  Dr Miko Jc recommends follow up w CBC in early May, appt scheduled, pt instructed to get CBC done a day to a f

## 2019-01-28 NOTE — TELEPHONE ENCOUNTER
Pt had called earlier to reschedule his follow-up. His appt is in May. I called him, LM asking him that if he would like, he can come one hour prior to his appt with Dr Franky Solano to have his lab done.   Asked him to please call back if he would like to have la

## 2019-05-07 ENCOUNTER — TELEPHONE (OUTPATIENT)
Dept: HEMATOLOGY/ONCOLOGY | Facility: HOSPITAL | Age: 54
End: 2019-05-07

## 2019-05-07 ENCOUNTER — APPOINTMENT (OUTPATIENT)
Dept: HEMATOLOGY/ONCOLOGY | Facility: HOSPITAL | Age: 54
End: 2019-05-07
Attending: INTERNAL MEDICINE
Payer: COMMERCIAL

## 2019-05-07 NOTE — TELEPHONE ENCOUNTER
Nyla Cross called to cx today's appt because he has to work. He did not want to reschedule at this time.  jesika

## 2019-05-21 RX ORDER — ALPRAZOLAM 1 MG/1
TABLET ORAL
Qty: 30 TABLET | Refills: 0 | Status: SHIPPED | OUTPATIENT
Start: 2019-05-21 | End: 2019-06-27 | Stop reason: DRUGHIGH

## 2019-06-04 RX ORDER — DILTIAZEM HYDROCHLORIDE 180 MG/1
180 CAPSULE, COATED, EXTENDED RELEASE ORAL
Qty: 90 CAPSULE | Refills: 1 | Status: SHIPPED | OUTPATIENT
Start: 2019-06-04 | End: 2020-02-24

## 2019-06-24 ENCOUNTER — TELEPHONE (OUTPATIENT)
Dept: FAMILY MEDICINE CLINIC | Facility: CLINIC | Age: 54
End: 2019-06-24

## 2019-06-24 NOTE — TELEPHONE ENCOUNTER
Left voicemail informing patient that we have not received FMLA forms for him and to please have forms faxed to [phone].    -patient also needs to schedule a current office visit with Dr Frieda Lopez, last OV-5-8-2018 with Dr Frieda Lopez.

## 2019-06-25 NOTE — TELEPHONE ENCOUNTER
Patient LM, tried to reach patient x2 at [phone]. I can hear him but he cannot hear me. Will try later.  SC

## 2019-06-27 ENCOUNTER — OFFICE VISIT (OUTPATIENT)
Dept: FAMILY MEDICINE CLINIC | Facility: CLINIC | Age: 54
End: 2019-06-27
Payer: COMMERCIAL

## 2019-06-27 ENCOUNTER — APPOINTMENT (OUTPATIENT)
Dept: LAB | Facility: HOSPITAL | Age: 54
End: 2019-06-27
Attending: FAMILY MEDICINE
Payer: COMMERCIAL

## 2019-06-27 VITALS
HEIGHT: 71 IN | SYSTOLIC BLOOD PRESSURE: 110 MMHG | TEMPERATURE: 98 F | RESPIRATION RATE: 17 BRPM | DIASTOLIC BLOOD PRESSURE: 84 MMHG | HEART RATE: 58 BPM | BODY MASS INDEX: 26 KG/M2

## 2019-06-27 DIAGNOSIS — M10.9 GOUT, UNSPECIFIED CAUSE, UNSPECIFIED CHRONICITY, UNSPECIFIED SITE: ICD-10-CM

## 2019-06-27 DIAGNOSIS — I10 ESSENTIAL HYPERTENSION: Primary | ICD-10-CM

## 2019-06-27 DIAGNOSIS — F41.9 ANXIETY: ICD-10-CM

## 2019-06-27 DIAGNOSIS — Z13.29 THYROID DISORDER SCREEN: ICD-10-CM

## 2019-06-27 DIAGNOSIS — M99.01 CERVICOTHORACIC SOMATIC DYSFUNCTION: ICD-10-CM

## 2019-06-27 PROCEDURE — 98925 OSTEOPATH MANJ 1-2 REGIONS: CPT | Performed by: FAMILY MEDICINE

## 2019-06-27 PROCEDURE — 36415 COLL VENOUS BLD VENIPUNCTURE: CPT

## 2019-06-27 PROCEDURE — 99214 OFFICE O/P EST MOD 30 MIN: CPT | Performed by: FAMILY MEDICINE

## 2019-06-27 PROCEDURE — 99212 OFFICE O/P EST SF 10 MIN: CPT | Performed by: FAMILY MEDICINE

## 2019-06-27 RX ORDER — CYCLOBENZAPRINE HCL 10 MG
10 TABLET ORAL 3 TIMES DAILY
Qty: 30 TABLET | Refills: 1 | Status: SHIPPED | OUTPATIENT
Start: 2019-06-27 | End: 2019-07-17

## 2019-06-27 RX ORDER — ALPRAZOLAM 2 MG/1
2 TABLET ORAL NIGHTLY PRN
Qty: 60 TABLET | Refills: 2 | Status: SHIPPED | OUTPATIENT
Start: 2019-06-27 | End: 2019-12-26

## 2019-06-27 RX ORDER — ZOLPIDEM TARTRATE 10 MG/1
TABLET ORAL
Qty: 90 TABLET | Refills: 1 | Status: SHIPPED | OUTPATIENT
Start: 2019-06-27 | End: 2020-01-30

## 2019-06-27 NOTE — PATIENT INSTRUCTIONS
Medication reviewed and renewed where needed and appropriate. Comply with medications. Monitor blood pressures and record at home. Limit salt intake. Encouraged physical fitness and daily physical activity daily (PLAY).   To gastrointestinal specialist.

## 2019-06-27 NOTE — PROGRESS NOTES
HPI:    Patient ID: Poly Beckman is a 48year old male. Patient also needs his FMLA forms updated. The patient is a 59-year-old -American male who is following-up to his chronic gout problem.  The current episode started more than 1 year ag Tartrate 10 MG Oral Tab TAKE 1 TABLET BY MOUTH NIGHTLY AS NEEDED Disp: 90 tablet Rfl: 1   AmLODIPine Besylate 10 MG Oral Tab Take 1 tablet (10 mg total) by mouth once daily.  Disp: 90 tablet Rfl: 1   ALPRAZOLAM 1 MG Oral Tab TAKE 1 TABLET BY MOUTH NIGHTLY A prescribed. The dosing has been increased from the 1 mg tablet to the 2 mg tablet.     4. Cervicothoracic somatic dysfunction  Osteopathic manipulation performed at the cervical and thoracic region without much success in the cervical region but a good rel

## 2019-07-07 DIAGNOSIS — D72.10 EOSINOPHILIA: ICD-10-CM

## 2019-07-07 DIAGNOSIS — D69.6 THROMBOCYTOPENIA (HCC): ICD-10-CM

## 2019-07-07 DIAGNOSIS — D72.821 MONOCYTOSIS: Primary | ICD-10-CM

## 2019-07-10 NOTE — PROGRESS NOTES
Patient returning the call (verified name and ), advised Dr Charan Engel note and verbalized understanding. Dr Reddy Jonathan office number given.     Notes recorded by Ruth Thomas DO on 2019 at 10:52 AM CDT  All lab results reviewed and are normal

## 2019-07-16 ENCOUNTER — OFFICE VISIT (OUTPATIENT)
Dept: HEMATOLOGY/ONCOLOGY | Facility: HOSPITAL | Age: 54
End: 2019-07-16
Attending: INTERNAL MEDICINE
Payer: COMMERCIAL

## 2019-07-16 VITALS
HEIGHT: 71 IN | BODY MASS INDEX: 27.34 KG/M2 | SYSTOLIC BLOOD PRESSURE: 134 MMHG | DIASTOLIC BLOOD PRESSURE: 90 MMHG | OXYGEN SATURATION: 98 % | TEMPERATURE: 99 F | HEART RATE: 82 BPM | WEIGHT: 195.31 LBS | RESPIRATION RATE: 18 BRPM

## 2019-07-16 DIAGNOSIS — D69.6 THROMBOCYTOPENIA (HCC): ICD-10-CM

## 2019-07-16 DIAGNOSIS — D75.1 ERYTHROCYTOSIS: Primary | ICD-10-CM

## 2019-07-16 DIAGNOSIS — D72.821 MONOCYTOSIS: ICD-10-CM

## 2019-07-16 PROCEDURE — 99214 OFFICE O/P EST MOD 30 MIN: CPT | Performed by: INTERNAL MEDICINE

## 2019-07-16 NOTE — PROGRESS NOTES
Cancer Center Progress Note    Patient Name: Norma Flores   YOB: 1965   Medical Record Number: [id number]   Attending Physician: Orlin Cano M.D.      Chief Complaint:  Erythrocytosis    History of Present Illness:  22-year-old male evaluate Day Smoker        Packs/day: 0.00        Years: 20.00        Pack years: 0        Types: Cigars      Smokeless tobacco: Never Used      Tobacco comment: 2-3 cigars daily    Substance and Sexual Activity      Alcohol use:  Yes        Alcohol/week: 0.0 oz daily., Disp: 30 tablet, Rfl: 5    Allergies:  No Known Allergies     Review of Systems:  All other systems reviewed and negative x12    Vital Signs:  /90 (BP Location: Left arm, Patient Position: Sitting)   Pulse 82   Temp 98.7 °F (37.1 °C) (Oral) only borderline low  –Return to clinic in 1 year with repeat CBC      The patient's emotional well being was assessed and resources were discussed. Appropriate resources were reviewed and discussed with the patient and family.      Ronald Kelley MD

## 2019-07-23 NOTE — TELEPHONE ENCOUNTER
Dr. Atul Ruiz #[address]  [address] [facility] #2    Please sign off on form:  -Highlight the patient and hit \"Chart\" button. -In Chart Review, w/in the Encounter tab - click 1 time on the Telephone call encounter for 6/24/19.  Janeen pineda

## 2019-07-31 NOTE — TELEPHONE ENCOUNTER
FMLA forms #1-Hypertension  & #2-Gout faxed to [employer] [facility] @ [phone], confirm rcvd. Copy mailed to pt.

## 2019-08-12 RX ORDER — CYCLOBENZAPRINE HCL 10 MG
TABLET ORAL
Qty: 90 TABLET | Refills: 1 | Status: SHIPPED | OUTPATIENT
Start: 2019-08-12 | End: 2020-08-11 | Stop reason: ALTCHOICE

## 2019-08-12 NOTE — TELEPHONE ENCOUNTER
Review pended refill request as it does not fall under a protocol.   Requested Prescriptions     Pending Prescriptions Disp Refills   • CYCLOBENZAPRINE HCL 10 MG Oral Tab [Pharmacy Med Name: CYCLOBENZAPRINE 10 MG TABLET] 30 tablet 0     Sig: TAKE 1 TABLET B

## 2019-12-26 RX ORDER — ALPRAZOLAM 2 MG/1
TABLET ORAL
Qty: 30 TABLET | Refills: 1 | Status: SHIPPED | OUTPATIENT
Start: 2019-12-26 | End: 2020-03-28

## 2019-12-26 NOTE — TELEPHONE ENCOUNTER
No MyChart. CSS=call and assists for FU OV. LOV 6/27/19: Instructions         Return in about 1 month (around 7/25/2019), or if symptoms worsen or fail to improve. Review pended refill request as it does not fall under a protocol.     Requested

## 2020-01-21 ENCOUNTER — TELEPHONE (OUTPATIENT)
Dept: GASTROENTEROLOGY | Facility: CLINIC | Age: 55
End: 2020-01-21

## 2020-01-21 NOTE — TELEPHONE ENCOUNTER
----- Message from Shaggy Gant RN sent at 1/20/2020  7:42 AM CST -----  Regarding: CLN Recall  Recall colon   Due: In 2 months   Received: Today   Message Contents   Charis Vigil, CHIARA  [facility] Clinical Staff         Recall colon in 3 years per CB.  Col

## 2020-01-31 RX ORDER — ZOLPIDEM TARTRATE 10 MG/1
TABLET ORAL
Qty: 90 TABLET | Refills: 0 | Status: SHIPPED | OUTPATIENT
Start: 2020-01-31 | End: 2020-05-11

## 2020-01-31 NOTE — TELEPHONE ENCOUNTER
Controlled medication pending for review. Please change to phone in, fax, or print script if not being sent electronically.     Requested Prescriptions     Pending Prescriptions Disp Refills   • ZOLPIDEM TARTRATE 10 MG Oral Tab [Pharmacy Med Name: ZOLPIDEM

## 2020-02-24 RX ORDER — DILTIAZEM HYDROCHLORIDE 180 MG/1
CAPSULE, COATED, EXTENDED RELEASE ORAL
Qty: 90 CAPSULE | Refills: 1 | Status: SHIPPED | OUTPATIENT
Start: 2020-02-24 | End: 2020-08-24

## 2020-02-25 NOTE — TELEPHONE ENCOUNTER
Please review; protocol failed.     Hypertensive Medications Protocol Failed2/24 1:54 AM   Appointment in past 6 or next 3 months

## 2020-03-27 ENCOUNTER — NURSE TRIAGE (OUTPATIENT)
Dept: FAMILY MEDICINE CLINIC | Facility: CLINIC | Age: 55
End: 2020-03-27

## 2020-03-27 ENCOUNTER — TELEPHONE (OUTPATIENT)
Dept: FAMILY MEDICINE CLINIC | Facility: CLINIC | Age: 55
End: 2020-03-27

## 2020-03-27 RX ORDER — ALPRAZOLAM 2 MG/1
TABLET ORAL
Qty: 30 TABLET | Refills: 1 | Status: CANCELLED | OUTPATIENT
Start: 2020-03-27

## 2020-03-27 NOTE — TELEPHONE ENCOUNTER
Patient states he was advised to stay home for 7 days syptom free before being to able to return to work. Please see encounter from Earlier today 3/27. It looks like Dr. Mariel Caldwell advised him to stay home.      Patient is asking for a note for his employer s

## 2020-03-27 NOTE — TELEPHONE ENCOUNTER
Action Requested: Summary for Provider     []  Critical Lab, Recommendations Needed  [] Need Additional Advice  []   FYI    []   Need Orders  [] Need Medications Sent to Pharmacy  []  Other     SUMMARY: Patient does not meet the criteria for COVID 19 testi

## 2020-03-30 RX ORDER — AMLODIPINE BESYLATE 10 MG/1
10 TABLET ORAL
Qty: 90 TABLET | Refills: 1 | Status: SHIPPED | OUTPATIENT
Start: 2020-03-30 | End: 2020-11-21

## 2020-03-30 RX ORDER — ALPRAZOLAM 2 MG/1
2 TABLET ORAL NIGHTLY PRN
Qty: 30 TABLET | Refills: 1 | Status: SHIPPED | OUTPATIENT
Start: 2020-03-30 | End: 2020-05-11

## 2020-03-30 NOTE — TELEPHONE ENCOUNTER
Pt calling back for letter to be [facility] to [facility]-    Letter pending -showing Status Open

## 2020-04-07 ENCOUNTER — NURSE TRIAGE (OUTPATIENT)
Dept: OTHER | Age: 55
End: 2020-04-07

## 2020-04-07 NOTE — TELEPHONE ENCOUNTER
Action Requested: Summary for Provider     []  Critical Lab, Recommendations Needed  [x] Need Additional Advice  []   FYI    []   Need Orders  [] Need Medications Sent to Pharmacy  []  Other     SUMMARY: pt states since 3/27/20 has had body aches, scratc factors  Alleviated by: nothing alleviates complaint             Reason for Disposition  • MODERATE difficulty breathing (e.g., speaks in phrases, SOB even at rest, pulse 100-120) of new onset or worse than normal    Protocols used: BREATHING DIFFICULTY-A-

## 2020-04-07 NOTE — TELEPHONE ENCOUNTER
Spoke with pt and MD message below given. Pt verb understanding and agrees to plan and will go to ER now.

## 2020-04-07 NOTE — TELEPHONE ENCOUNTER
Based on his symptoms I do not think he meets criteria for COVID testing but his breathing should be evaluated, I agree, needs to go to the ER

## 2020-04-08 NOTE — TELEPHONE ENCOUNTER
Noted.  I agree with recommendations per the ER. The patient can update us regarding his status as needed.

## 2020-04-08 NOTE — TELEPHONE ENCOUNTER
Verified pt name and . Pt states he was seen in [facility] ER as advised. Pt had an EKG, chest X-ray, strep culture, nasal swab for flu and some lab work completed. Pt was told he may have had symptoms of Covid-19.  Pt state he was not tested

## 2020-04-09 ENCOUNTER — TELEPHONE (OUTPATIENT)
Dept: OTHER | Age: 55
End: 2020-04-09

## 2020-04-09 NOTE — TELEPHONE ENCOUNTER
Pt calling to inform the disability company his employer uses [address] [place], will be contacting Dr Fontana's office to obtain notes related to his recent symptoms. Pt states he signed a PJ for this.     Advised pt will make a note in his chart in regards to t

## 2020-04-15 NOTE — TELEPHONE ENCOUNTER
Eugenio and farheen received in forms dept. Logged for processing. Sent [employer] message for missing hipaa.

## 2020-04-20 NOTE — TELEPHONE ENCOUNTER
The return to no statement has been completed. I sent it to my chart. Please call the patient and let him know.

## 2020-04-20 NOTE — TELEPHONE ENCOUNTER
Spoke with the patient who is requesting for Dr. Champ Prather to clear him to go back to work on 4/22/20. Patient reports he denied having any other symptom at the moment, besides a mild \"scratchy' throat. Message routed to provider for review.

## 2020-05-11 RX ORDER — ZOLPIDEM TARTRATE 10 MG/1
TABLET ORAL
Qty: 90 TABLET | Refills: 0 | Status: SHIPPED | OUTPATIENT
Start: 2020-05-11 | End: 2021-01-19

## 2020-05-11 RX ORDER — ALPRAZOLAM 2 MG/1
2 TABLET ORAL NIGHTLY PRN
Qty: 30 TABLET | Refills: 1 | Status: SHIPPED | OUTPATIENT
Start: 2020-05-11 | End: 2020-11-21

## 2020-08-11 ENCOUNTER — OFFICE VISIT (OUTPATIENT)
Dept: FAMILY MEDICINE CLINIC | Facility: CLINIC | Age: 55
End: 2020-08-11
Payer: COMMERCIAL

## 2020-08-11 ENCOUNTER — NURSE TRIAGE (OUTPATIENT)
Dept: FAMILY MEDICINE CLINIC | Facility: CLINIC | Age: 55
End: 2020-08-11

## 2020-08-11 VITALS
DIASTOLIC BLOOD PRESSURE: 100 MMHG | HEART RATE: 86 BPM | WEIGHT: 193 LBS | RESPIRATION RATE: 18 BRPM | HEIGHT: 71 IN | BODY MASS INDEX: 27.02 KG/M2 | SYSTOLIC BLOOD PRESSURE: 134 MMHG

## 2020-08-11 DIAGNOSIS — K58.2 IRRITABLE BOWEL SYNDROME WITH BOTH CONSTIPATION AND DIARRHEA: Primary | ICD-10-CM

## 2020-08-11 PROCEDURE — 99213 OFFICE O/P EST LOW 20 MIN: CPT | Performed by: PHYSICIAN ASSISTANT

## 2020-08-11 PROCEDURE — 3080F DIAST BP >= 90 MM HG: CPT | Performed by: PHYSICIAN ASSISTANT

## 2020-08-11 PROCEDURE — 3008F BODY MASS INDEX DOCD: CPT | Performed by: PHYSICIAN ASSISTANT

## 2020-08-11 PROCEDURE — 3075F SYST BP GE 130 - 139MM HG: CPT | Performed by: PHYSICIAN ASSISTANT

## 2020-08-11 RX ORDER — DICYCLOMINE HYDROCHLORIDE 10 MG/1
10 CAPSULE ORAL
Qty: 120 CAPSULE | Refills: 3 | Status: SHIPPED | OUTPATIENT
Start: 2020-08-11 | End: 2020-11-04

## 2020-08-11 NOTE — TELEPHONE ENCOUNTER
Action Requested: Summary for Provider     []  Critical Lab, Recommendations Needed  [] Need Additional Advice  []   FYI    []   Need Orders  [] Need Medications Sent to Pharmacy  []  Other     SUMMARY: Spoke with the patient who reports for the past sever

## 2020-08-11 NOTE — PROGRESS NOTES
HPI:   Abdominal Pain   This is a new problem. The current episode started more than 1 month ago. The problem occurs intermittently. The problem has been unchanged. The pain is located in the periumbilical region. The quality of the pain is cramping.  The a 2010    right elbow ligament torn    • Foot surgery Left 1999    archilles tendon torn   • Hernia surgery         Family History:     Family History   Problem Relation Age of Onset   • Diabetes Mother        Social History:   Social History    Socioeconomi activity change, chills, fatigue and fever. HENT: Negative for congestion, ear discharge, ear pain, postnasal drip, rhinorrhea, sinus pressure and sore throat. Respiratory: Negative for cough, chest tightness, shortness of breath and wheezing.     Card Digestive    Irritable bowel syndrome with both constipation and diarrhea - Primary     Start Bentyl 10 mg PO QID before meals. Advise patient to follow up with GI due to history of colon polyps. Patient verbalized understanding.                Discussed pl

## 2020-08-12 ENCOUNTER — E-VISIT (OUTPATIENT)
Dept: FAMILY MEDICINE CLINIC | Facility: CLINIC | Age: 55
End: 2020-08-12

## 2020-08-12 DIAGNOSIS — Z02.9 ADMINISTRATIVE ENCOUNTER: Primary | ICD-10-CM

## 2020-08-12 PROBLEM — K58.2 IRRITABLE BOWEL SYNDROME WITH BOTH CONSTIPATION AND DIARRHEA: Status: ACTIVE | Noted: 2020-08-12

## 2020-08-12 NOTE — ASSESSMENT & PLAN NOTE
Start Bentyl 10 mg PO QID before meals. Advise patient to follow up with GI due to history of colon polyps. Patient verbalized understanding.

## 2020-08-13 ENCOUNTER — TELEPHONE (OUTPATIENT)
Dept: FAMILY MEDICINE CLINIC | Facility: CLINIC | Age: 55
End: 2020-08-13

## 2020-08-13 NOTE — PROGRESS NOTES
Patient requested n E-Visit. It was thought that he was communicating directly with his PCP. Instructed to call PCP's office for appointment or virtual visit. NO CHARGES ASSESSED FOR THIS E-VISIT. Please see E-Visit for further information.

## 2020-08-13 NOTE — TELEPHONE ENCOUNTER
Spoke with pt,  verified, pt stated he was seen by [id number] lastTday for IBS and he was recommended to f/u with PCP. Virtual appt made as requested.          Future Appointments   Date Time Provider Poonam Ag   2020 10:40 AM Tyson Boyer

## 2020-08-18 ENCOUNTER — TELEMEDICINE (OUTPATIENT)
Dept: FAMILY MEDICINE CLINIC | Facility: CLINIC | Age: 55
End: 2020-08-18

## 2020-08-18 DIAGNOSIS — F51.04 PSYCHOPHYSIOLOGICAL INSOMNIA: ICD-10-CM

## 2020-08-18 DIAGNOSIS — R19.5 PASSAGE OF LOOSE STOOLS: Primary | ICD-10-CM

## 2020-08-18 DIAGNOSIS — Z86.010 HISTORY OF COLON POLYPS: ICD-10-CM

## 2020-08-18 PROCEDURE — 99214 OFFICE O/P EST MOD 30 MIN: CPT | Performed by: FAMILY MEDICINE

## 2020-08-18 RX ORDER — ZOLPIDEM TARTRATE 12.5 MG/1
12.5 TABLET, FILM COATED, EXTENDED RELEASE ORAL NIGHTLY PRN
Qty: 30 TABLET | Refills: 1 | Status: SHIPPED | OUTPATIENT
Start: 2020-08-18 | End: 2020-11-23

## 2020-08-18 NOTE — PATIENT INSTRUCTIONS
H. pylori and other stool studies have been ordered. Patient needs repeat colonoscopy and referral has been generated. Per the patient's current history he is not a candidate for COVID-19 testing.

## 2020-08-19 NOTE — PROGRESS NOTES
HPI:    Patient ID: Renato Enriquez is a 47year old male. This patient is a 55-year-old -American male who is well-established at our clinic who was doing a follow-up after being recently seen for loose/diarrhea stools.   He was diagnosed as havin person, place, and time. ASSESSMENT/PLAN:   1. Passage of loose stools  The following have been ordered.  - H. PYLORI STOOL AG, EIA  - STOOL CULTURE W/SHIGATOXIN; Future  - WBC, STOOL    2. History of colon polyps  Referred.   - Micheal

## 2020-08-24 ENCOUNTER — APPOINTMENT (OUTPATIENT)
Dept: LAB | Facility: HOSPITAL | Age: 55
End: 2020-08-24
Attending: FAMILY MEDICINE
Payer: COMMERCIAL

## 2020-08-24 PROCEDURE — 87338 HPYLORI STOOL AG IA: CPT | Performed by: FAMILY MEDICINE

## 2020-08-24 RX ORDER — DILTIAZEM HYDROCHLORIDE 180 MG/1
CAPSULE, COATED, EXTENDED RELEASE ORAL
Qty: 90 CAPSULE | Refills: 1 | Status: SHIPPED | OUTPATIENT
Start: 2020-08-24 | End: 2021-09-22

## 2020-08-27 LAB — H PYLORI AG STL QL IA: NEGATIVE

## 2020-08-28 ENCOUNTER — LAB ENCOUNTER (OUTPATIENT)
Dept: LAB | Facility: HOSPITAL | Age: 55
End: 2020-08-28
Attending: FAMILY MEDICINE
Payer: COMMERCIAL

## 2020-08-28 DIAGNOSIS — R19.5 PASSAGE OF LOOSE STOOLS: ICD-10-CM

## 2020-08-28 PROCEDURE — 87046 STOOL CULTR AEROBIC BACT EA: CPT

## 2020-08-28 PROCEDURE — 87427 SHIGA-LIKE TOXIN AG IA: CPT

## 2020-08-28 PROCEDURE — 89055 LEUKOCYTE ASSESSMENT FECAL: CPT | Performed by: FAMILY MEDICINE

## 2020-08-28 PROCEDURE — 87045 FECES CULTURE AEROBIC BACT: CPT

## 2020-08-29 NOTE — TELEPHONE ENCOUNTER
From: Odilia Leon  To: Carlton Haskins DO  Sent: 8/28/2020 3:55 PM CDT  Subject: Prescription Question    The last prescription for sleep zolpidem tart er 12.5 mg, is not effective. Can prescribe something else?

## 2020-09-02 NOTE — TELEPHONE ENCOUNTER
I have sent in a prescription for the generic form of Lunesta another tablet indicated for insomnia.

## 2020-11-04 RX ORDER — DICYCLOMINE HYDROCHLORIDE 10 MG/1
10 CAPSULE ORAL
Qty: 360 CAPSULE | Refills: 1 | Status: SHIPPED | OUTPATIENT
Start: 2020-11-04 | End: 2020-12-04

## 2020-11-23 DIAGNOSIS — F51.04 PSYCHOPHYSIOLOGICAL INSOMNIA: ICD-10-CM

## 2020-11-23 RX ORDER — AMLODIPINE BESYLATE 10 MG/1
10 TABLET ORAL
Qty: 90 TABLET | Refills: 1 | Status: SHIPPED | OUTPATIENT
Start: 2020-11-23 | End: 2021-09-22

## 2020-11-23 RX ORDER — ALPRAZOLAM 2 MG/1
2 TABLET ORAL NIGHTLY PRN
Qty: 30 TABLET | Refills: 1 | Status: SHIPPED | OUTPATIENT
Start: 2020-11-23 | End: 2021-01-19

## 2020-11-23 RX ORDER — ZOLPIDEM TARTRATE 12.5 MG/1
12.5 TABLET, FILM COATED, EXTENDED RELEASE ORAL NIGHTLY PRN
Qty: 30 TABLET | Refills: 1 | Status: SHIPPED | OUTPATIENT
Start: 2020-11-23

## 2021-01-19 RX ORDER — ALPRAZOLAM 2 MG/1
2 TABLET ORAL NIGHTLY PRN
Qty: 30 TABLET | Refills: 1 | Status: SHIPPED | OUTPATIENT
Start: 2021-01-19 | End: 2021-03-29

## 2021-01-19 RX ORDER — ZOLPIDEM TARTRATE 10 MG/1
TABLET ORAL
Qty: 90 TABLET | Refills: 0 | Status: SHIPPED | OUTPATIENT
Start: 2021-01-19 | End: 2021-03-29

## 2021-02-04 NOTE — TELEPHONE ENCOUNTER
[employer] [facility] form for Dr. Kailyn Martinez received in Forms dept. Logged for processing.  NK oral

## 2021-03-29 RX ORDER — ALPRAZOLAM 2 MG/1
2 TABLET ORAL NIGHTLY PRN
Qty: 30 TABLET | Refills: 1 | Status: SHIPPED | OUTPATIENT
Start: 2021-03-29 | End: 2021-08-19

## 2021-03-29 RX ORDER — ZOLPIDEM TARTRATE 10 MG/1
TABLET ORAL
Qty: 90 TABLET | Refills: 0 | Status: SHIPPED | OUTPATIENT
Start: 2021-03-29 | End: 2021-10-26

## 2021-05-17 ENCOUNTER — OFFICE VISIT (OUTPATIENT)
Dept: FAMILY MEDICINE CLINIC | Facility: CLINIC | Age: 56
End: 2021-05-17
Payer: COMMERCIAL

## 2021-05-17 VITALS
BODY MASS INDEX: 27.3 KG/M2 | HEIGHT: 71 IN | DIASTOLIC BLOOD PRESSURE: 84 MMHG | TEMPERATURE: 98 F | RESPIRATION RATE: 17 BRPM | SYSTOLIC BLOOD PRESSURE: 118 MMHG | WEIGHT: 195 LBS

## 2021-05-17 DIAGNOSIS — M67.431 GANGLION CYST OF WRIST, RIGHT: ICD-10-CM

## 2021-05-17 DIAGNOSIS — Z12.11 COLON CANCER SCREENING: Primary | ICD-10-CM

## 2021-05-17 DIAGNOSIS — M54.2 CERVICALGIA: ICD-10-CM

## 2021-05-17 DIAGNOSIS — F51.04 PSYCHOPHYSIOLOGICAL INSOMNIA: ICD-10-CM

## 2021-05-17 DIAGNOSIS — Z00.00 ROUTINE PHYSICAL EXAMINATION: ICD-10-CM

## 2021-05-17 DIAGNOSIS — I10 ESSENTIAL HYPERTENSION: ICD-10-CM

## 2021-05-17 PROCEDURE — 99396 PREV VISIT EST AGE 40-64: CPT | Performed by: FAMILY MEDICINE

## 2021-05-17 PROCEDURE — 3074F SYST BP LT 130 MM HG: CPT | Performed by: FAMILY MEDICINE

## 2021-05-17 PROCEDURE — 93000 ELECTROCARDIOGRAM COMPLETE: CPT | Performed by: FAMILY MEDICINE

## 2021-05-17 PROCEDURE — 3079F DIAST BP 80-89 MM HG: CPT | Performed by: FAMILY MEDICINE

## 2021-05-17 PROCEDURE — 3008F BODY MASS INDEX DOCD: CPT | Performed by: FAMILY MEDICINE

## 2021-05-17 RX ORDER — ZOLPIDEM TARTRATE 12.5 MG/1
12.5 TABLET, FILM COATED, EXTENDED RELEASE ORAL NIGHTLY PRN
Qty: 30 TABLET | Refills: 1 | Status: SHIPPED | OUTPATIENT
Start: 2021-05-17 | End: 2021-09-22

## 2021-05-22 NOTE — PROGRESS NOTES
HPI/Subjective:   Patient ID: Alethea Lo is a 54year old male.     54year old AA male here for complete preventive care physical and for status update on any confirmed chronic medical illnesses and follow up on any previous labs or procedures that wer rhythm. Heart sounds: Normal heart sounds. Pulmonary:      Effort: Pulmonary effort is normal. No respiratory distress. Breath sounds: Normal breath sounds. Abdominal:      General: There is no distension. Palpations: Abdomen is soft. tablet (12.5 mg total) by mouth nightly as needed for Sleep.        Imaging & Referrals:  ELECTROCARDIOGRAM, COMPLETE  GASTRO - INTERNAL  ORTHOPEDIC - INTERNAL  MRI SPINE CERVICAL (CPT=72141)   Patient Instructions   All adult screening ordered and done leatha

## 2021-05-25 ENCOUNTER — TELEPHONE (OUTPATIENT)
Dept: INTERNAL MEDICINE CLINIC | Facility: CLINIC | Age: 56
End: 2021-05-25

## 2021-05-25 NOTE — TELEPHONE ENCOUNTER
FMLA forms dropped off at the [place] [address] office, filled the [employer], paid fee with Credit card, scanned to the PJ dept and placed in the forms box

## 2021-05-26 NOTE — TELEPHONE ENCOUNTER
Fmla received in forms dept. Logged for processing. Hipaa states to release forms to [address] [place] [address]?

## 2021-06-01 ENCOUNTER — TELEPHONE (OUTPATIENT)
Dept: CASE MANAGEMENT | Age: 56
End: 2021-06-01

## 2021-06-01 DIAGNOSIS — M54.2 CERVICALGIA: Primary | ICD-10-CM

## 2021-06-01 NOTE — TELEPHONE ENCOUNTER
HI Dr. Jacquelene Severe,    The MRI you ordered for Alyssa Isaacs has been denied by his insurance. Patient has been notified and advised to f/u with you for his future plan of care.     Thank you  Silvio Ugarte

## 2021-06-02 NOTE — TELEPHONE ENCOUNTER
Since the insurance has denied this patient to cover for cervical MRI, please call him and let him know that I have ordered a neck x-ray and also referred him to physiatry for further evaluation.   If the specialist support my assessment, then perhaps they

## 2021-06-03 NOTE — TELEPHONE ENCOUNTER
Pt informed of message from [facility]. Number given to schedule appointment with Sanjiv Perez. Agrees to have neck Xray done. All questions answered. No further action needed.

## 2021-06-04 NOTE — TELEPHONE ENCOUNTER
LMTCB - to get details for FMLA. Previously had 2 different set of FMLA for 2 different diagnosis, only received one FMLA this time.

## 2021-06-21 ENCOUNTER — TELEPHONE (OUTPATIENT)
Dept: GASTROENTEROLOGY | Facility: CLINIC | Age: 56
End: 2021-06-21

## 2021-06-21 DIAGNOSIS — Z86.010 HX OF COLONIC POLYPS: Primary | ICD-10-CM

## 2021-06-21 NOTE — TELEPHONE ENCOUNTER
Hira Amaya MD   Physician   Specialty:  GASTROENTEROLOGY   Operative Report   Signed   Date of Service:  3/20/2017  1:28 PM               Signed           COLONOSCOPY PROCEDURE REPORT     DATE OF PROCEDURE:  3/20/2017      PCP: Margret Argueta hemorrhoids     RECOMMENDATIONS:  · High fiber diet.   · Follow-up above colon polyp pathology results  · Repeat colonoscopy examination in 3 years if most polyps hyperplastic; otherwise 1 year  · No aspirin or NSAID medications for next 10 days to prevent retroflexed view in the proximal ascending and one in the mid ascending. Both about 5-6 mm in size.   Both removed by cold snare polypectomy  · Larger 6-8 mm pedunculated polyp removed from mid sigmoid by snare cautery polypectomy  · 6 mm sessile polyp rem

## 2021-06-21 NOTE — TELEPHONE ENCOUNTER
Last Procedure, Date, MD:  03/20/2017, Colonoscopy.  Dr Raven Correa  Last Diagnosis:  Tubular adenoma  Recalled for (mth/yrs): 3 years  Sedation used previously:  MAC  Last Prep Used (if known):  Suprep    Quality of prep (if known): good  Anticoagulants: no  Elenore Donate

## 2021-06-22 RX ORDER — SODIUM, POTASSIUM,MAG SULFATES 17.5-3.13G
1 SOLUTION, RECONSTITUTED, ORAL ORAL ONCE
Qty: 1 EACH | Refills: 0 | Status: SHIPPED | OUTPATIENT
Start: 2021-06-22 | End: 2021-06-22

## 2021-06-22 NOTE — TELEPHONE ENCOUNTER
Thanks all. Recent office visit Dr. Feliciano Gandhi 5/17/2021 reviewed.     GI schedulers –    Please schedule colonoscopy exam at [facility] (Phani Acosta)    BMI Readings from Last 1 Encounters:  05/17/21 : 27.20 kg/m²     MAC anesthesia

## 2021-06-24 NOTE — TELEPHONE ENCOUNTER
Dr. Aristides Parker    2 FMLA requests - 1 for HTN (1-3 days per month) and 1 for Gout (1-2 days per month) - same info as from 2019. Both to start on 5/20/21 and end on 5/20/22   Please sign off on forms if you approve:  -Highlight the patient and hit \"Chart\" button. -In Chart Review, w/in the Encounter tab - click 1 time on the Telephone call encounter for 5/25/21 Scroll down the telephone encounter.  -Click \"scan on\" blue Hyperlink under \"Media\" heading for FMLA 1 & FMLA 2 Dr. Aristides Parker 6/24/21  w/in the telephone enc.  -Click on Acknowledge button at the bottom right corner and left-click onto image, signature stamp appears and drag signature to Provider signature line. Stamp will turn blue. Close window.      Thank you,  [facility]

## 2021-06-24 NOTE — TELEPHONE ENCOUNTER
Spoke to pt today and he still needed both FMLA forms completed. Recerts from 4889 to start from 5/20/21 for 12 months and confirmed fax# on bottom of form is for HR.

## 2021-06-28 NOTE — TELEPHONE ENCOUNTER
Tried several attempts on Friday and today to fax out forms with continued failure. Sent copies through [facility] and called patient to bring copies to work or call back with another number.

## 2021-07-23 NOTE — TELEPHONE ENCOUNTER
Scheduled for:  Colonoscopy 87[address]  Provider Name:  Dr Letitia Navarrete  Date:  10/26/21  Location:  [facility]  Sedation:  MAC  Time:  2:30pm (pt is aware to arrive at 1:30pm)  Prep:  Suprep  Meds/Allergies Reconciled?:  Physician reviewed   Diagnosis with codes:  History

## 2021-08-20 RX ORDER — ALPRAZOLAM 2 MG/1
2 TABLET ORAL NIGHTLY PRN
Qty: 30 TABLET | Refills: 1 | Status: SHIPPED | OUTPATIENT
Start: 2021-08-20 | End: 2021-10-30

## 2021-08-20 NOTE — TELEPHONE ENCOUNTER
Please review; protocol failed/no protocol    Requested Prescriptions   Pending Prescriptions Disp Refills    alprazolam 2 MG Oral Tab 30 tablet 1     Sig: Take 1 tablet (2 mg total) by mouth nightly as needed.         There is no refill protocol informatio

## 2021-09-22 DIAGNOSIS — F51.04 PSYCHOPHYSIOLOGICAL INSOMNIA: ICD-10-CM

## 2021-09-22 RX ORDER — DILTIAZEM HYDROCHLORIDE 180 MG/1
180 CAPSULE, COATED, EXTENDED RELEASE ORAL DAILY
Qty: 90 CAPSULE | Refills: 1 | Status: SHIPPED | OUTPATIENT
Start: 2021-09-22

## 2021-09-22 RX ORDER — AMLODIPINE BESYLATE 10 MG/1
10 TABLET ORAL DAILY
Qty: 90 TABLET | Refills: 1 | Status: SHIPPED | OUTPATIENT
Start: 2021-09-22

## 2021-09-22 RX ORDER — ZOLPIDEM TARTRATE 12.5 MG/1
12.5 TABLET, FILM COATED, EXTENDED RELEASE ORAL NIGHTLY PRN
Qty: 30 TABLET | Refills: 1 | Status: SHIPPED | OUTPATIENT
Start: 2021-09-22 | End: 2021-10-26

## 2021-09-22 NOTE — TELEPHONE ENCOUNTER
Refill passed per [facility] protocol. RN called patient to clarify that he is still taking this medication. RN spoke with patient. Patient's date of birth and full name both confirmed. RN reviewed both medications in detail with patient.    Lina Mala Mae MD    Office Visit          Future Appointments         Provider Department Appt Notes    In 1 month ANDRZEJ, PROCEDURE [address] GI PROCEDURE [email or web address]

## 2021-09-22 NOTE — TELEPHONE ENCOUNTER
Dr. Tyson Berger pended for your review and approval. Thank you. RN called patient about another medication refill (see other encounter). RN spoke with patient. Patient's date of birth and full name both confirmed.      Patient requesting refill

## 2021-09-24 ENCOUNTER — TELEPHONE (OUTPATIENT)
Dept: FAMILY MEDICINE CLINIC | Facility: CLINIC | Age: 56
End: 2021-09-24

## 2021-09-24 NOTE — TELEPHONE ENCOUNTER
Ute scheduled an appointment in [address] on 9/28 for the following symptoms.     My mental health

## 2021-09-28 ENCOUNTER — TELEMEDICINE (OUTPATIENT)
Dept: FAMILY MEDICINE CLINIC | Facility: CLINIC | Age: 56
End: 2021-09-28

## 2021-09-28 DIAGNOSIS — F41.9 ANXIETY: ICD-10-CM

## 2021-09-28 DIAGNOSIS — F51.04 PSYCHOPHYSIOLOGICAL INSOMNIA: ICD-10-CM

## 2021-09-28 DIAGNOSIS — F32.2 CURRENT SEVERE EPISODE OF MAJOR DEPRESSIVE DISORDER WITHOUT PSYCHOTIC FEATURES, UNSPECIFIED WHETHER RECURRENT (HCC): ICD-10-CM

## 2021-09-28 DIAGNOSIS — Z56.6 STRESS AT WORK: Primary | ICD-10-CM

## 2021-09-28 PROCEDURE — 99214 OFFICE O/P EST MOD 30 MIN: CPT | Performed by: FAMILY MEDICINE

## 2021-09-28 SDOH — HEALTH STABILITY - MENTAL HEALTH: OTHER PHYSICAL AND MENTAL STRAIN RELATED TO WORK: Z56.6

## 2021-09-28 NOTE — PROGRESS NOTES
Subjective:   Patient ID: Rosemarie Armenta is a 64year old male.     This patient is a 25-year-old -American male who is well-established at our clinic who consents to a virtual video visit we have been treating for anxiety and psychologically induced total) by mouth nightly as needed. 30 tablet 1   • Zolpidem Tartrate 10 MG Oral Tab TAKE 1 TABLET BY MOUTH EVERY DAY AT NIGHT AS NEEDED (Patient not taking: Reported on 6/21/2021 ) 90 tablet 0   • Zolpidem Tartrate ER 12.5 MG Oral Tab CR Take 1 tablet (12. fail to improve.

## 2021-09-28 NOTE — PATIENT INSTRUCTIONS
I do recommend immediate total disability secondary to mental duress. Patient has been encouraged to contact his HR department immediately to start the process.   FMLA will still be supported, but this patient status warrants immediate time of and psychiat

## 2021-10-01 ENCOUNTER — TELEPHONE (OUTPATIENT)
Dept: FAMILY MEDICINE CLINIC | Facility: CLINIC | Age: 56
End: 2021-10-01

## 2021-10-01 NOTE — TELEPHONE ENCOUNTER
FMLA forms received in the [facility] via fax for patient. Forms emailed to [email or web address], original forms left in the [facility].

## 2021-10-04 ENCOUNTER — TELEPHONE (OUTPATIENT)
Dept: FAMILY MEDICINE CLINIC | Facility: CLINIC | Age: 56
End: 2021-10-04

## 2021-10-07 NOTE — TELEPHONE ENCOUNTER
Spoke to pt and he will email the HIPAA/FCR forms to the dept. Would like it faxed to [facility] once completed. Continuous leave: 9/28/21 - 12/20/21 (Depression/anxiety).

## 2021-10-12 NOTE — TELEPHONE ENCOUNTER
Dr. Sylwia Morton,     Please sign off on form: FMLA   -Highlight the patient and hit \"Chart\" button.   -In Chart Review, w/in the Encounter tab - click 1 time on the Telephone call encounter for 10/1/21 Scroll down the telephone encounter.  -Click \"scan on\"

## 2021-10-13 NOTE — TELEPHONE ENCOUNTER
Additional fmla and mentalcapacity received in forms dept. Logged for processing.  Documenting here to avoid disrupting signature on 10/1/21 encounter

## 2021-10-18 NOTE — TELEPHONE ENCOUNTER
LINDA completed and faxed to [facility] .  Sent pt [employer] message

## 2021-10-20 RX ORDER — ERGOCALCIFEROL 1.25 MG/1
CAPSULE ORAL
COMMUNITY

## 2021-10-20 NOTE — TELEPHONE ENCOUNTER
Pt called requesting forms to be faxed to [facility] at [phone].  Request completed

## 2021-10-23 ENCOUNTER — LAB ENCOUNTER (OUTPATIENT)
Dept: LAB | Age: 56
End: 2021-10-23
Attending: INTERNAL MEDICINE
Payer: COMMERCIAL

## 2021-10-23 DIAGNOSIS — Z01.818 PRE-OP TESTING: ICD-10-CM

## 2021-10-25 NOTE — TELEPHONE ENCOUNTER
Spoke w/ pt in regard Mental Capacity form we received.  Informed pt that this form needs to be completed by psychiatrist. Wen Arguello gave me psychiatrist information, pt is aware this form was forwarded to Zaynab Rios at [address] [place] [address]

## 2021-10-26 ENCOUNTER — ANESTHESIA (OUTPATIENT)
Dept: ENDOSCOPY | Facility: HOSPITAL | Age: 56
End: 2021-10-26
Payer: COMMERCIAL

## 2021-10-26 ENCOUNTER — HOSPITAL ENCOUNTER (OUTPATIENT)
Facility: HOSPITAL | Age: 56
Setting detail: HOSPITAL OUTPATIENT SURGERY
Discharge: HOME | End: 2021-10-26
Attending: INTERNAL MEDICINE | Admitting: INTERNAL MEDICINE
Payer: COMMERCIAL

## 2021-10-26 ENCOUNTER — ANESTHESIA EVENT (OUTPATIENT)
Dept: ENDOSCOPY | Facility: HOSPITAL | Age: 56
End: 2021-10-26
Payer: COMMERCIAL

## 2021-10-26 VITALS
SYSTOLIC BLOOD PRESSURE: 166 MMHG | RESPIRATION RATE: 18 BRPM | TEMPERATURE: 98 F | HEART RATE: 50 BPM | WEIGHT: 190 LBS | BODY MASS INDEX: 27.2 KG/M2 | OXYGEN SATURATION: 98 % | HEIGHT: 70 IN | DIASTOLIC BLOOD PRESSURE: 96 MMHG

## 2021-10-26 DIAGNOSIS — Z01.818 PRE-OP TESTING: Primary | ICD-10-CM

## 2021-10-26 DIAGNOSIS — Z86.010 HX OF COLONIC POLYPS: ICD-10-CM

## 2021-10-26 PROBLEM — F41.0 PANIC DISORDER (EPISODIC PAROXYSMAL ANXIETY): Status: ACTIVE | Noted: 2021-10-26

## 2021-10-26 PROBLEM — F39 UNSPECIFIED MOOD (AFFECTIVE) DISORDER: Status: ACTIVE | Noted: 2021-10-26

## 2021-10-26 PROBLEM — F41.9 ANXIETY: Status: RESOLVED | Noted: 2017-07-22 | Resolved: 2021-10-26

## 2021-10-26 PROBLEM — F41.1 GAD (GENERALIZED ANXIETY DISORDER): Status: ACTIVE | Noted: 2021-10-26

## 2021-10-26 PROBLEM — F39 UNSPECIFIED MOOD (AFFECTIVE) DISORDER (HCC): Status: ACTIVE | Noted: 2021-10-26

## 2021-10-26 PROCEDURE — 0D5N8ZZ DESTRUCTION OF SIGMOID COLON, VIA NATURAL OR ARTIFICIAL OPENING ENDOSCOPIC: ICD-10-PCS | Performed by: INTERNAL MEDICINE

## 2021-10-26 PROCEDURE — 0DBL8ZX EXCISION OF TRANSVERSE COLON, VIA NATURAL OR ARTIFICIAL OPENING ENDOSCOPIC, DIAGNOSTIC: ICD-10-PCS | Performed by: INTERNAL MEDICINE

## 2021-10-26 PROCEDURE — 45380 COLONOSCOPY AND BIOPSY: CPT | Performed by: INTERNAL MEDICINE

## 2021-10-26 PROCEDURE — 0D5P8ZZ DESTRUCTION OF RECTUM, VIA NATURAL OR ARTIFICIAL OPENING ENDOSCOPIC: ICD-10-PCS | Performed by: INTERNAL MEDICINE

## 2021-10-26 PROCEDURE — 45385 COLONOSCOPY W/LESION REMOVAL: CPT | Performed by: INTERNAL MEDICINE

## 2021-10-26 RX ORDER — LIDOCAINE HYDROCHLORIDE 10 MG/ML
INJECTION, SOLUTION EPIDURAL; INFILTRATION; INTRACAUDAL; PERINEURAL AS NEEDED
Status: DISCONTINUED | OUTPATIENT
Start: 2021-10-26 | End: 2021-10-26 | Stop reason: SURG

## 2021-10-26 RX ORDER — SODIUM CHLORIDE, SODIUM LACTATE, POTASSIUM CHLORIDE, CALCIUM CHLORIDE 600; 310; 30; 20 MG/100ML; MG/100ML; MG/100ML; MG/100ML
INJECTION, SOLUTION INTRAVENOUS CONTINUOUS
Status: DISCONTINUED | OUTPATIENT
Start: 2021-10-26 | End: 2021-10-26

## 2021-10-26 RX ORDER — NALOXONE HYDROCHLORIDE 0.4 MG/ML
80 INJECTION, SOLUTION INTRAMUSCULAR; INTRAVENOUS; SUBCUTANEOUS AS NEEDED
Status: DISCONTINUED | OUTPATIENT
Start: 2021-10-26 | End: 2021-10-26

## 2021-10-26 RX ADMIN — SODIUM CHLORIDE, SODIUM LACTATE, POTASSIUM CHLORIDE, CALCIUM CHLORIDE: 600; 310; 30; 20 INJECTION, SOLUTION INTRAVENOUS at 14:22:00

## 2021-10-26 RX ADMIN — LIDOCAINE HYDROCHLORIDE 25 MG: 10 INJECTION, SOLUTION EPIDURAL; INFILTRATION; INTRACAUDAL; PERINEURAL at 14:25:00

## 2021-10-26 NOTE — OPERATIVE REPORT
COLONOSCOPY PROCEDURE REPORT     DATE OF PROCEDURE:  10/26/2021     PCP: Alex Cabral DO     PREOPERATIVE DIAGNOSIS:  history adenomatous colon polyps     POSTOPERATIVE DIAGNOSIS:  See impression. SURGEON:  GAMALIEL Castle

## 2021-10-26 NOTE — ANESTHESIA PREPROCEDURE EVALUATION
Anesthesia PreOp Note    HPI:     Alethea Lo is a 64year old male who presents for preoperative consultation requested by: Segundo Ingram MD    Date of Surgery: 10/26/2021    Procedure(s):  COLONOSCOPY  Indication: Hx of colonic polyps    R Take 1 tablet (1 mg total) by mouth 2 (two) times daily as needed. , Disp: 60 tablet, Rfl: 0, 10/26/2021 at 0800  FLUoxetine (PROZAC) 10 MG Oral Cap, Take 1 cap PO daily for 1 wk then increase to 2 cap PO daily beginning wk 2, Disp: 60 capsule, Rfl: 0  [employer] in the Last Year: Not on file  Transportation Needs:       Lack of Transportation (Medical): Not on file      Lack of Transportation (Non-Medical):  Not on file  Physical Activity:       Days of Exercise per Week: Not on file      Minutes of Exercise p GI/Hepatic/Renal    (+) bowel prep    Endo/Other - negative ROS   Abdominal                Anesthesia Plan:   ASA:  2  Plan:   General  Informed Consent Plan and Risks Discussed With:  Patient  Discussed plan with:  Surgeon      I have informed Jackline Heimlich

## 2021-10-26 NOTE — ANESTHESIA POSTPROCEDURE EVALUATION
Patient: Carlus Fossa    Procedure Summary     Date: 10/26/21 Room / Location: [address] [place] [address] ENDOSCOPY 05 / [address] [place] [address] ENDOSCOPY    Anesthesia Start: 9397 Anesthesia Stop: 9110    Procedure: COLONOSCOPY (N/A ) Diagnosis:       Hx of colonic polyps      (polyps, diverticu

## 2021-10-26 NOTE — H&P
History & Physical Examination    Patient Name: Candice Carlson  MRN: [id number]  [facility]: [id number]  YOB: 1965    Diagnosis: history adenomatous colon polyps    Present Illness: 75-year-old gentleman with history of hypertension, hernia surgery; Tobacco Use      Smoking status: Current Every Day Smoker        Packs/day: 0.00        Years: 20.00        Pack years: 0        Types: Cigars      Smokeless tobacco: Never Used      Tobacco comment: 2-3 cigars daily    Alcohol use:  Yes      Alcohol/week:

## 2021-10-31 RX ORDER — ALPRAZOLAM 2 MG/1
2 TABLET ORAL NIGHTLY PRN
Qty: 30 TABLET | Refills: 1 | Status: SHIPPED | OUTPATIENT
Start: 2021-10-31

## 2021-11-09 ENCOUNTER — TELEPHONE (OUTPATIENT)
Dept: GASTROENTEROLOGY | Facility: CLINIC | Age: 56
End: 2021-11-09

## 2021-11-10 ENCOUNTER — TELEPHONE (OUTPATIENT)
Dept: FAMILY MEDICINE CLINIC | Facility: CLINIC | Age: 56
End: 2021-11-10

## 2021-11-10 NOTE — TELEPHONE ENCOUNTER
Patient states he needs copies of medical records from September 2021 until present faxed to [facility],  Claim # [id number]    Patient states he has left messages with forms department and medical records department and has not received a call back.

## 2021-11-22 ENCOUNTER — PATIENT MESSAGE (OUTPATIENT)
Dept: FAMILY MEDICINE CLINIC | Facility: CLINIC | Age: 56
End: 2021-11-22

## 2021-11-23 NOTE — TELEPHONE ENCOUNTER
From: Zara Hancock  To: Rad Gold DO  Sent: 11/22/2021 12:39 PM CST  Subject: Return to work    Dr. Bhupendra Rapp can send me a relase to return back to work letter for December 1st. I'm feeling better and ready to go back to work! Thanks!

## 2021-11-26 NOTE — TELEPHONE ENCOUNTER
Patient's letter to return to work has been sent to his [address] Please call him and let him know.

## 2022-02-21 ENCOUNTER — APPOINTMENT (OUTPATIENT)
Dept: GENERAL RADIOLOGY | Age: 57
End: 2022-02-21
Attending: EMERGENCY MEDICINE

## 2022-02-21 ENCOUNTER — HOSPITAL ENCOUNTER (EMERGENCY)
Age: 57
Discharge: HOME | End: 2022-02-21
Attending: EMERGENCY MEDICINE

## 2022-02-21 ENCOUNTER — APPOINTMENT (OUTPATIENT)
Dept: CARDIOLOGY | Age: 57
End: 2022-02-21
Attending: INTERNAL MEDICINE

## 2022-02-21 ENCOUNTER — TELEPHONE (OUTPATIENT)
Dept: CARDIOLOGY | Age: 57
End: 2022-02-21

## 2022-02-21 VITALS
WEIGHT: 190 LBS | DIASTOLIC BLOOD PRESSURE: 112 MMHG | SYSTOLIC BLOOD PRESSURE: 161 MMHG | TEMPERATURE: 98.3 F | RESPIRATION RATE: 18 BRPM | BODY MASS INDEX: 25.73 KG/M2 | HEIGHT: 72 IN | OXYGEN SATURATION: 92 % | HEART RATE: 70 BPM

## 2022-02-21 DIAGNOSIS — I10 HYPERTENSION, UNSPECIFIED TYPE: Primary | ICD-10-CM

## 2022-02-21 LAB
ANION GAP SERPL CALC-SCNC: 13 MMOL/L (ref 10–20)
BASOPHILS # BLD: 0 K/MCL (ref 0–0.3)
BASOPHILS NFR BLD: 1 %
BUN SERPL-MCNC: 8 MG/DL (ref 6–20)
BUN/CREAT SERPL: 8 (ref 7–25)
CALCIUM SERPL-MCNC: 8.4 MG/DL (ref 8.4–10.2)
CHLORIDE SERPL-SCNC: 106 MMOL/L (ref 98–107)
CO2 SERPL-SCNC: 27 MMOL/L (ref 21–32)
CREAT SERPL-MCNC: 1.03 MG/DL (ref 0.67–1.17)
D DIMER PPP FEU-MCNC: 0.3 MG/L (FEU)
DEPRECATED RDW RBC: 45.6 FL (ref 39–50)
EOSINOPHIL # BLD: 0.3 K/MCL (ref 0–0.5)
EOSINOPHIL NFR BLD: 5 %
ERYTHROCYTE [DISTWIDTH] IN BLOOD: 13.9 % (ref 11–15)
FASTING DURATION TIME PATIENT: ABNORMAL H
GFR SERPLBLD BASED ON 1.73 SQ M-ARVRAT: >90 ML/MIN
GLUCOSE SERPL-MCNC: 108 MG/DL (ref 70–99)
HCT VFR BLD CALC: 50.2 % (ref 39–51)
HGB BLD-MCNC: 17.1 G/DL (ref 13–17)
IMM GRANULOCYTES # BLD AUTO: 0 K/MCL (ref 0–0.2)
IMM GRANULOCYTES # BLD: 0 %
LYMPHOCYTES # BLD: 2.3 K/MCL (ref 1–4)
LYMPHOCYTES NFR BLD: 37 %
MCH RBC QN AUTO: 30.4 PG (ref 26–34)
MCHC RBC AUTO-ENTMCNC: 34.1 G/DL (ref 32–36.5)
MCV RBC AUTO: 89.3 FL (ref 78–100)
MONOCYTES # BLD: 0.8 K/MCL (ref 0.3–0.9)
MONOCYTES NFR BLD: 13 %
NEUTROPHILS # BLD: 2.7 K/MCL (ref 1.8–7.7)
NEUTROPHILS NFR BLD: 44 %
NRBC BLD MANUAL-RTO: 0 /100 WBC
NT-PROBNP SERPL-MCNC: 71 PG/ML
PLATELET # BLD AUTO: 149 K/MCL (ref 140–450)
POTASSIUM SERPL-SCNC: 3.1 MMOL/L (ref 3.4–5.1)
RAINBOW EXTRA TUBES HOLD SPECIMEN: NORMAL
RAINBOW EXTRA TUBES HOLD SPECIMEN: NORMAL
RBC # BLD: 5.62 MIL/MCL (ref 4.5–5.9)
SODIUM SERPL-SCNC: 143 MMOL/L (ref 135–145)
TROPONIN I SERPL DL<=0.01 NG/ML-MCNC: 10 NG/L
WBC # BLD: 6.2 K/MCL (ref 4.2–11)

## 2022-02-21 PROCEDURE — 85379 FIBRIN DEGRADATION QUANT: CPT | Performed by: EMERGENCY MEDICINE

## 2022-02-21 PROCEDURE — 10002803 HB RX 637: Performed by: EMERGENCY MEDICINE

## 2022-02-21 PROCEDURE — 93306 TTE W/DOPPLER COMPLETE: CPT | Performed by: INTERNAL MEDICINE

## 2022-02-21 PROCEDURE — 96374 THER/PROPH/DIAG INJ IV PUSH: CPT

## 2022-02-21 PROCEDURE — 93005 ELECTROCARDIOGRAM TRACING: CPT | Performed by: EMERGENCY MEDICINE

## 2022-02-21 PROCEDURE — 80048 BASIC METABOLIC PNL TOTAL CA: CPT | Performed by: EMERGENCY MEDICINE

## 2022-02-21 PROCEDURE — 93005 ELECTROCARDIOGRAM TRACING: CPT | Performed by: STUDENT IN AN ORGANIZED HEALTH CARE EDUCATION/TRAINING PROGRAM

## 2022-02-21 PROCEDURE — 83880 ASSAY OF NATRIURETIC PEPTIDE: CPT | Performed by: EMERGENCY MEDICINE

## 2022-02-21 PROCEDURE — 85025 COMPLETE CBC W/AUTO DIFF WBC: CPT | Performed by: EMERGENCY MEDICINE

## 2022-02-21 PROCEDURE — 93306 TTE W/DOPPLER COMPLETE: CPT

## 2022-02-21 PROCEDURE — 71045 X-RAY EXAM CHEST 1 VIEW: CPT

## 2022-02-21 PROCEDURE — 84484 ASSAY OF TROPONIN QUANT: CPT | Performed by: EMERGENCY MEDICINE

## 2022-02-21 PROCEDURE — 10002800 HB RX 250 W HCPCS: Performed by: EMERGENCY MEDICINE

## 2022-02-21 PROCEDURE — 99285 EMERGENCY DEPT VISIT HI MDM: CPT

## 2022-02-21 RX ORDER — AMLODIPINE BESYLATE 10 MG/1
10 TABLET ORAL ONCE
Status: COMPLETED | OUTPATIENT
Start: 2022-02-21 | End: 2022-02-21

## 2022-02-21 RX ADMIN — KETOROLAC TROMETHAMINE 15 MG: 15 INJECTION, SOLUTION INTRAMUSCULAR; INTRAVENOUS at 12:45

## 2022-02-21 RX ADMIN — AMLODIPINE BESYLATE 10 MG: 10 TABLET ORAL at 11:48

## 2022-02-21 ASSESSMENT — HEART SCORE
TROPONIN: EQUAL OR LESS THAN NORMAL LIMIT
HISTORY: SLIGHTLY SUSPICIOUS
HEART SCORE: 3
EKG: NON SPECIFIC REPOLARIZATION DISTURBANCE
RISK FACTORS: 1-2 RISK FACTORS
AGE: GREATER THAN 45 TO LESS THAN 65

## 2022-02-21 ASSESSMENT — PAIN SCALES - GENERAL
PAINLEVEL_OUTOF10: 8
PAINLEVEL_OUTOF10: 0

## 2022-02-21 ASSESSMENT — ENCOUNTER SYMPTOMS
HEADACHES: 1
SORE THROAT: 0
RHINORRHEA: 0
WEAKNESS: 0
VOMITING: 0
WOUND: 0
ABDOMINAL PAIN: 0
CHILLS: 0
COUGH: 0
FEVER: 0
EYE DISCHARGE: 0
SHORTNESS OF BREATH: 1
SEIZURES: 0

## 2022-02-22 LAB
P AXIS (DEGREES): 38
P AXIS (DEGREES): 56
PR-INTERVAL (MSEC): 157
PR-INTERVAL (MSEC): 175
QRS-INTERVAL (MSEC): 89
QRS-INTERVAL (MSEC): 90
QT-INTERVAL (MSEC): 410
QT-INTERVAL (MSEC): 438
QTC: 427
QTC: 440
R AXIS (DEGREES): -73
R AXIS (DEGREES): -74
REPORT TEXT: NORMAL
REPORT TEXT: NORMAL
T AXIS (DEGREES): -152
T AXIS (DEGREES): -175
VENTRICULAR RATE EKG/MIN (BPM): 60
VENTRICULAR RATE EKG/MIN (BPM): 68

## 2022-03-18 RX ORDER — ZOLPIDEM TARTRATE 12.5 MG/1
12.5 TABLET, FILM COATED, EXTENDED RELEASE ORAL NIGHTLY PRN
Qty: 30 TABLET | Refills: 1 | Status: CANCELLED | OUTPATIENT
Start: 2022-03-18

## 2022-03-18 RX ORDER — ALPRAZOLAM 2 MG/1
2 TABLET ORAL NIGHTLY PRN
Qty: 30 TABLET | Refills: 1 | Status: CANCELLED | OUTPATIENT
Start: 2022-03-18

## 2022-03-19 ENCOUNTER — TELEPHONE (OUTPATIENT)
Dept: FAMILY MEDICINE CLINIC | Facility: CLINIC | Age: 57
End: 2022-03-19

## 2022-03-19 RX ORDER — ALPRAZOLAM 2 MG/1
TABLET ORAL
Qty: 30 TABLET | Refills: 1 | Status: SHIPPED | OUTPATIENT
Start: 2022-03-19

## 2022-03-19 RX ORDER — ZOLPIDEM TARTRATE 12.5 MG/1
12.5 TABLET, FILM COATED, EXTENDED RELEASE ORAL NIGHTLY PRN
Qty: 30 TABLET | Refills: 1 | Status: SHIPPED | OUTPATIENT
Start: 2022-03-19

## 2022-03-19 NOTE — TELEPHONE ENCOUNTER
Requesting a prior auth for   ZOLPIDEM TARTRATE ER 12.5MG ER     Key [facility]   Last name: Varun HENRY 1965    Complete PA through cover [employer].  Com

## 2022-03-21 ENCOUNTER — TELEPHONE (OUTPATIENT)
Dept: FAMILY MEDICINE CLINIC | Facility: CLINIC | Age: 57
End: 2022-03-21

## 2022-03-29 ENCOUNTER — OFFICE VISIT (OUTPATIENT)
Dept: FAMILY MEDICINE CLINIC | Facility: CLINIC | Age: 57
End: 2022-03-29
Payer: COMMERCIAL

## 2022-03-29 ENCOUNTER — MED REC SCAN ONLY (OUTPATIENT)
Dept: FAMILY MEDICINE CLINIC | Facility: CLINIC | Age: 57
End: 2022-03-29

## 2022-03-29 VITALS
HEIGHT: 70 IN | TEMPERATURE: 98 F | HEART RATE: 99 BPM | DIASTOLIC BLOOD PRESSURE: 82 MMHG | SYSTOLIC BLOOD PRESSURE: 110 MMHG | WEIGHT: 192.25 LBS | BODY MASS INDEX: 27.52 KG/M2

## 2022-03-29 DIAGNOSIS — R39.14 FEELING OF INCOMPLETE BLADDER EMPTYING: ICD-10-CM

## 2022-03-29 DIAGNOSIS — N39.43 URINARY DRIBBLING: ICD-10-CM

## 2022-03-29 DIAGNOSIS — R39.15 URINARY URGENCY: Primary | ICD-10-CM

## 2022-03-29 DIAGNOSIS — M25.512 CHRONIC LEFT SHOULDER PAIN: ICD-10-CM

## 2022-03-29 DIAGNOSIS — I10 ESSENTIAL HYPERTENSION: ICD-10-CM

## 2022-03-29 DIAGNOSIS — G89.29 CHRONIC LEFT SHOULDER PAIN: ICD-10-CM

## 2022-03-29 PROCEDURE — 51798 US URINE CAPACITY MEASURE: CPT | Performed by: FAMILY MEDICINE

## 2022-03-29 PROCEDURE — 3074F SYST BP LT 130 MM HG: CPT | Performed by: FAMILY MEDICINE

## 2022-03-29 PROCEDURE — 3079F DIAST BP 80-89 MM HG: CPT | Performed by: FAMILY MEDICINE

## 2022-03-29 PROCEDURE — 99214 OFFICE O/P EST MOD 30 MIN: CPT | Performed by: FAMILY MEDICINE

## 2022-03-29 PROCEDURE — 3008F BODY MASS INDEX DOCD: CPT | Performed by: FAMILY MEDICINE

## 2022-03-29 NOTE — PATIENT INSTRUCTIONS
Patient being referred to urology for complete evaluation regarding urinary symptoms including urinary dribbling and poor urinary bladder emptying and urgency. Bladder capacity ultrasound ordered. FMLA to be continued with the same allowances regarding the hypertension/gout. Medication compliance recommended and encouraged. Urine studies sent as well. To Ortho regarding left shoulder.

## 2022-03-30 LAB
APPEARANCE: CLEAR
BILIRUBIN: NEGATIVE
GLUCOSE: NEGATIVE
KETONES: NEGATIVE
LEUKOCYTE ESTERASE: NEGATIVE
NITRITE: NEGATIVE
OCCULT BLOOD: NEGATIVE
SPECIFIC GRAVITY: 1.02 (ref 1–1.03)

## 2022-04-06 ENCOUNTER — TELEPHONE (OUTPATIENT)
Dept: FAMILY MEDICINE CLINIC | Facility: CLINIC | Age: 57
End: 2022-04-06

## 2022-04-06 NOTE — TELEPHONE ENCOUNTER
Patient states he is missing an ultrasound order from Dr. Ramon Ly.  His last office visit was 3/29/22

## 2022-04-21 ENCOUNTER — TELEPHONE (OUTPATIENT)
Dept: INTERNAL MEDICINE CLINIC | Facility: CLINIC | Age: 57
End: 2022-04-21

## 2022-04-21 NOTE — TELEPHONE ENCOUNTER
Forms for FMLA faxed over to the [place] office, scanned to the forms dept and placed in the forms box.

## 2022-04-29 NOTE — TELEPHONE ENCOUNTER
Pt called requesting status imformed him of our turnaround time. Let pt know we received forms on 04/21 pt stated was upset stated he turned his forms in earlier than the 21st. I explained not sure what happened but the forms we received were on the 04/21. Pt stated he received a denial letter I explained to call company and request and ext and to let us know if he was granted an ext.

## 2022-05-02 ENCOUNTER — HOSPITAL ENCOUNTER (OUTPATIENT)
Dept: ULTRASOUND IMAGING | Age: 57
Discharge: HOME | End: 2022-05-02
Attending: FAMILY MEDICINE
Payer: COMMERCIAL

## 2022-05-02 DIAGNOSIS — R35.0 URINARY FREQUENCY: ICD-10-CM

## 2022-05-02 PROCEDURE — 76857 US EXAM PELVIC LIMITED: CPT | Performed by: FAMILY MEDICINE

## 2022-05-05 NOTE — TELEPHONE ENCOUNTER
Dr. Aristides Parker,     Please sign off on form:  -Highlight the patient and hit \"Chart\" button. -In Chart Review, w/in the Encounter tab - click 1 time on the Telephone call encounter for 4/21/22 Scroll down the telephone encounter.  -Click \"scan on\" blue Hyperlink under \"Media\" heading for FMLA Dr. Aristides Parker 5/5/22 w/in the telephone enc.  -Click on Acknowledge button at the bottom right corner and left-click onto image, signature stamp appears and drag signature to Provider signature line. Stamp will turn blue. Close window.      Thank you,    Ruby Arnold

## 2022-05-23 DIAGNOSIS — F51.04 PSYCHOPHYSIOLOGICAL INSOMNIA: ICD-10-CM

## 2022-05-24 RX ORDER — ZOLPIDEM TARTRATE 12.5 MG/1
12.5 TABLET, FILM COATED, EXTENDED RELEASE ORAL NIGHTLY PRN
Qty: 30 TABLET | Refills: 1 | Status: SHIPPED | OUTPATIENT
Start: 2022-05-24

## 2022-05-24 NOTE — TELEPHONE ENCOUNTER
Please review; protocol failed/no protocol    Requested Prescriptions   Pending Prescriptions Disp Refills    Zolpidem Tartrate ER 12.5 MG Oral Tab CR 30 tablet 1     Sig: Take 1 tablet (12.5 mg total) by mouth nightly as needed for Sleep.         There is no refill protocol information for this order            Recent Outpatient Visits              1 month ago Urinary urgency    [facility], Rodney Rust, Kalyani Orlando DO    Office Visit    7 months ago Stress at work    [facility], Darion Genao Ivonne Beath, DO    Telemedicine    1 year ago Colon cancer screening    [facility], Rodney Rust HinesKalyani DO    Office Visit    1 year ago Passage of loose stools    [facility], Darion Genao Ivonne Beath, DO    Telemedicine    1 year ago Administrative encounter    [facility]    E-Visit             Future Appointments         Provider Department Appt Notes    In 1 month Trista Underwood DO [facility] [address] [place] [address]

## 2022-06-20 RX ORDER — AMLODIPINE BESYLATE 10 MG/1
TABLET ORAL
Qty: 90 TABLET | Refills: 1 | Status: SHIPPED | OUTPATIENT
Start: 2022-06-20

## 2022-07-26 RX ORDER — ALPRAZOLAM 2 MG/1
2 TABLET ORAL NIGHTLY PRN
Qty: 30 TABLET | Refills: 1 | Status: SHIPPED | OUTPATIENT
Start: 2022-07-26

## 2022-08-07 RX ORDER — ALPRAZOLAM 2 MG/1
2 TABLET ORAL NIGHTLY PRN
Qty: 30 TABLET | Refills: 1 | Status: SHIPPED | OUTPATIENT
Start: 2022-08-07

## 2022-08-08 ENCOUNTER — HOSPITAL ENCOUNTER (OUTPATIENT)
Age: 57
Discharge: ANOTHER HOSPITAL | End: 2022-08-08
Payer: COMMERCIAL

## 2022-08-08 ENCOUNTER — APPOINTMENT (OUTPATIENT)
Dept: GENERAL RADIOLOGY | Facility: HOSPITAL | Age: 57
End: 2022-08-08
Payer: COMMERCIAL

## 2022-08-08 ENCOUNTER — APPOINTMENT (OUTPATIENT)
Dept: GENERAL RADIOLOGY | Age: 57
End: 2022-08-08
Attending: NURSE PRACTITIONER
Payer: COMMERCIAL

## 2022-08-08 ENCOUNTER — HOSPITAL ENCOUNTER (EMERGENCY)
Facility: HOSPITAL | Age: 57
Discharge: HOME | End: 2022-08-08
Payer: COMMERCIAL

## 2022-08-08 VITALS
HEART RATE: 76 BPM | OXYGEN SATURATION: 100 % | SYSTOLIC BLOOD PRESSURE: 128 MMHG | DIASTOLIC BLOOD PRESSURE: 88 MMHG | TEMPERATURE: 99 F | RESPIRATION RATE: 18 BRPM | HEIGHT: 71 IN | WEIGHT: 190 LBS | BODY MASS INDEX: 26.6 KG/M2

## 2022-08-08 VITALS
WEIGHT: 190 LBS | HEIGHT: 71 IN | TEMPERATURE: 97 F | HEART RATE: 68 BPM | OXYGEN SATURATION: 99 % | SYSTOLIC BLOOD PRESSURE: 132 MMHG | DIASTOLIC BLOOD PRESSURE: 80 MMHG | RESPIRATION RATE: 18 BRPM | BODY MASS INDEX: 26.6 KG/M2

## 2022-08-08 DIAGNOSIS — R07.9 ACUTE CHEST PAIN: Primary | ICD-10-CM

## 2022-08-08 DIAGNOSIS — R07.89 CHEST PAIN, ATYPICAL: ICD-10-CM

## 2022-08-08 DIAGNOSIS — R05.1 ACUTE COUGH: ICD-10-CM

## 2022-08-08 DIAGNOSIS — R05.1 ACUTE COUGH: Primary | ICD-10-CM

## 2022-08-08 LAB
ALBUMIN SERPL-MCNC: 3.5 G/DL (ref 3.4–5)
ALBUMIN/GLOB SERPL: 0.8 {RATIO} (ref 1–2)
ALP LIVER SERPL-CCNC: 78 U/L
ALT SERPL-CCNC: 47 U/L
ANION GAP SERPL CALC-SCNC: 5 MMOL/L (ref 0–18)
AST SERPL-CCNC: 41 U/L (ref 15–37)
BASOPHILS # BLD AUTO: 0.04 X10(3) UL (ref 0–0.2)
BASOPHILS NFR BLD AUTO: 0.9 %
BILIRUB SERPL-MCNC: 0.6 MG/DL (ref 0.1–2)
BUN BLD-MCNC: 9 MG/DL (ref 7–18)
BUN/CREAT SERPL: 7.8 (ref 10–20)
CALCIUM BLD-MCNC: 8.7 MG/DL (ref 8.5–10.1)
CHLORIDE SERPL-SCNC: 106 MMOL/L (ref 98–112)
CO2 SERPL-SCNC: 29 MMOL/L (ref 21–32)
CREAT BLD-MCNC: 1.15 MG/DL
DEPRECATED RDW RBC AUTO: 46.6 FL (ref 35.1–46.3)
EOSINOPHIL # BLD AUTO: 0.33 X10(3) UL (ref 0–0.7)
EOSINOPHIL NFR BLD AUTO: 7.2 %
ERYTHROCYTE [DISTWIDTH] IN BLOOD BY AUTOMATED COUNT: 13.7 % (ref 11–15)
GFR SERPLBLD BASED ON 1.73 SQ M-ARVRAT: 75 ML/MIN/1.73M2 (ref 60–?)
GLOBULIN PLAS-MCNC: 4.2 G/DL (ref 2.8–4.4)
GLUCOSE BLD-MCNC: 116 MG/DL (ref 70–99)
HCT VFR BLD AUTO: 52.8 %
HGB BLD-MCNC: 17.8 G/DL
IMM GRANULOCYTES # BLD AUTO: 0.01 X10(3) UL (ref 0–1)
IMM GRANULOCYTES NFR BLD: 0.2 %
LYMPHOCYTES # BLD AUTO: 1.8 X10(3) UL (ref 1–4)
LYMPHOCYTES NFR BLD AUTO: 39 %
MCH RBC QN AUTO: 31 PG (ref 26–34)
MCHC RBC AUTO-ENTMCNC: 33.7 G/DL (ref 31–37)
MCV RBC AUTO: 91.8 FL
MONOCYTES # BLD AUTO: 0.59 X10(3) UL (ref 0.1–1)
MONOCYTES NFR BLD AUTO: 12.8 %
NEUTROPHILS # BLD AUTO: 1.84 X10 (3) UL (ref 1.5–7.7)
NEUTROPHILS # BLD AUTO: 1.84 X10(3) UL (ref 1.5–7.7)
NEUTROPHILS NFR BLD AUTO: 39.9 %
OSMOLALITY SERPL CALC.SUM OF ELEC: 290 MOSM/KG (ref 275–295)
PLATELET # BLD AUTO: 147 10(3)UL (ref 150–450)
POTASSIUM SERPL-SCNC: 3.5 MMOL/L (ref 3.5–5.1)
PROT SERPL-MCNC: 7.7 G/DL (ref 6.4–8.2)
RBC # BLD AUTO: 5.75 X10(6)UL
SARS-COV-2 RNA RESP QL NAA+PROBE: NOT DETECTED
SODIUM SERPL-SCNC: 140 MMOL/L (ref 136–145)
TROPONIN I HIGH SENSITIVITY: 8 NG/L
WBC # BLD AUTO: 4.6 X10(3) UL (ref 4–11)

## 2022-08-08 PROCEDURE — 93010 ELECTROCARDIOGRAM REPORT: CPT | Performed by: INTERNAL MEDICINE

## 2022-08-08 PROCEDURE — 93005 ELECTROCARDIOGRAM TRACING: CPT

## 2022-08-08 PROCEDURE — 71045 X-RAY EXAM CHEST 1 VIEW: CPT

## 2022-08-08 PROCEDURE — 99284 EMERGENCY DEPT VISIT MOD MDM: CPT

## 2022-08-08 PROCEDURE — 99214 OFFICE O/P EST MOD 30 MIN: CPT | Performed by: NURSE PRACTITIONER

## 2022-08-08 PROCEDURE — 36415 COLL VENOUS BLD VENIPUNCTURE: CPT

## 2022-08-08 PROCEDURE — 85025 COMPLETE CBC W/AUTO DIFF WBC: CPT

## 2022-08-08 PROCEDURE — 80053 COMPREHEN METABOLIC PANEL: CPT

## 2022-08-08 PROCEDURE — 84484 ASSAY OF TROPONIN QUANT: CPT

## 2022-08-08 PROCEDURE — U0002 COVID-19 LAB TEST NON-CDC: HCPCS | Performed by: NURSE PRACTITIONER

## 2022-08-08 PROCEDURE — 93000 ELECTROCARDIOGRAM COMPLETE: CPT | Performed by: NURSE PRACTITIONER

## 2022-08-08 RX ORDER — BENZONATATE 100 MG/1
100 CAPSULE ORAL 3 TIMES DAILY PRN
Qty: 30 CAPSULE | Refills: 0 | Status: SHIPPED | OUTPATIENT
Start: 2022-08-08 | End: 2022-09-07

## 2022-08-08 RX ORDER — CODEINE PHOSPHATE AND GUAIFENESIN 10; 100 MG/5ML; MG/5ML
5 SOLUTION ORAL EVERY 6 HOURS PRN
Qty: 120 ML | Refills: 0 | Status: SHIPPED | OUTPATIENT
Start: 2022-08-08 | End: 2022-08-15

## 2022-08-08 NOTE — ED INITIAL ASSESSMENT (HPI)
Pt to ED with c/o cough and CP x1 week. Pt states left side of chest and radiates to the middle of chest. Pt states sinus problems. CP rated 5/10. Sinus Srinath in IC EKG. Covid negative result given today.

## 2022-08-08 NOTE — ED QUICK NOTES
Pt given instructions/directions to emergency room. Pt left IC in stable condition via ambulatory. Main lab called and POCT CBC lab cancelled.

## 2022-08-13 ENCOUNTER — TELEPHONE (OUTPATIENT)
Dept: FAMILY MEDICINE CLINIC | Facility: CLINIC | Age: 57
End: 2022-08-13

## 2022-08-13 NOTE — TELEPHONE ENCOUNTER
Patient was seen in ER and had many test/ covid negative, but had cough, runny nose check discomfort. . they did ekg and chest xray and couldn't find anything,, would just like to speak with Dr. Donna Fung regarding if he needs a follow up or if he can do a phone like visit.   pls advise

## 2022-09-28 RX ORDER — ALPRAZOLAM 2 MG/1
2 TABLET ORAL NIGHTLY PRN
Qty: 30 TABLET | Refills: 0 | Status: SHIPPED | OUTPATIENT
Start: 2022-09-28 | End: 2022-11-24

## 2022-09-28 RX ORDER — DILTIAZEM HYDROCHLORIDE 180 MG/1
180 CAPSULE, COATED, EXTENDED RELEASE ORAL DAILY
Qty: 90 CAPSULE | Refills: 0 | Status: SHIPPED | OUTPATIENT
Start: 2022-09-28 | End: 2022-11-23

## 2022-09-28 NOTE — TELEPHONE ENCOUNTER
Dr John Conti  (on behalf of Dr Kathie Ayala )      Future Appointments   Date Time Provider Poonam Ag   11/14/2022  9:40 AM Danica Flor DO [facility]          Please review; protocol failed/no protocol. Requested Prescriptions   Pending Prescriptions Disp Refills    dilTIAZem HCl ER Coated Beads 180 MG Oral Capsule SR 24 Hr 90 capsule 1     Sig: Take 1 capsule (180 mg total) by mouth daily.         Hypertensive Medications Protocol Failed - 9/28/2022 11:25 AM        Failed - In person appointment or virtual visit in the past 6 months       Recent Outpatient Visits              6 months ago Urinary urgency    [address], [address], [place], Ninfa Portillo, DO    Office Visit    1 year ago Stress at work    [address], [address], [place], Ninfaspike Portillo, DO    Telemedicine    1 year ago Colon cancer screening    [address], [address], [place], Ninfa Portillo, DO    Office Visit    2 years ago Passage of loose stools    [address], [address], [place], Ninfaspike Portillo, DO    Telemedicine    2 years ago Administrative encounter    [facility], [address] [place] [address]     Future Appointments         Provider Department Appt Notes    In 1 month Danica Flor, [facility], [address], [address] - In person appointment in the past 12 or next 3 months       Recent Outpatient Visits              6 months ago Urinary urgency    [address], [address], [place], Ninfa Portillo, DO    Office Visit    1 year ago Stress at work    [address], [address], [place], Ninfaspike Diallodall, [address] [facility]    1 year ago [address], [address], [place], Ninfaspike Portillo, [address] Visit    2 years ago Passage of loose stools    [address], [address], [place], Ninfaspike Diallodall, DO    Telemedicine    2 years ago Administrative encounter [facility]    E-Visit     Future Appointments         Provider Department Appt Notes    In 1 month Freedom Martinez DO [facility] [address], [facility] physical               Passed - Last BP reading less than 140/90     BP Readings from Last 1 Encounters:  08/08/22 : 132/80                Passed - CMP or BMP in past 6 months     Recent Results (from the past 4392 hour(s))   Comp Metabolic Panel (14)    Collection Time: 08/08/22  5:19 PM   Result Value Ref Range    Glucose 116 (H) 70 - 99 mg/dL    Sodium 140 136 - 145 mmol/L    Potassium 3.5 3.5 - 5.1 mmol/L    Chloride 106 98 - 112 mmol/L    CO2 29.0 21.0 - 32.0 mmol/L    Anion Gap 5 0 - 18 mmol/L    BUN 9 7 - 18 mg/dL    Creatinine 1.15 0.70 - 1.30 mg/dL    BUN/CREA Ratio 7.8 (L) 10.0 - 20.0    Calcium, Total 8.7 8.5 - 10.1 mg/dL    Calculated Osmolality 290 275 - 295 mOsm/kg    eGFR-Cr 75 >=60 mL/min/1.73m2    ALT 47 16 - 61 U/L    AST 41 (H) 15 - 37 U/L    Alkaline Phosphatase 78 45 - 117 U/L    Bilirubin, Total 0.6 0.1 - 2.0 mg/dL    Total Protein 7.7 6.4 - 8.2 g/dL    Albumin 3.5 3.4 - 5.0 g/dL    Globulin  4.2 2.8 - 4.4 g/dL    A/G Ratio 0.8 (L) 1.0 - 2.0     *Note: Due to a large number of results and/or encounters for the requested time period, some results have not been displayed. A complete set of results can be found in Results Review. Passed - EGFRCR or GFRAA > 50     GFR Evaluation  EGFRCR: 75 , resulted on 8/8/2022               alprazolam 2 MG Oral Tab 30 tablet 1     Sig: Take 1 tablet (2 mg total) by mouth nightly as needed.         There is no refill protocol information for this order            Recent Outpatient Visits              6 months ago Urinary urgency    [facility] [address], Jasbir Orlando DO    Office Visit    1 year ago Stress at work    [facility] [address], [place], Jasbir Le DO    Telemedicine    1 year ago Colon cancer screening    Darion Carey Ester Mustard, DO    Office Visit    2 years ago Passage of loose stools    Dar[address] Carey Ester Mustard, DO    Telemedicine    2 years ago Administrative encounter    [facility], [facility]    E-Visit             Future Appointments         Provider Department Appt Notes    In 1 month Alfred Godinez DO [address], [facility]

## 2022-11-23 RX ORDER — DILTIAZEM HYDROCHLORIDE 180 MG/1
CAPSULE, COATED, EXTENDED RELEASE ORAL
Qty: 90 CAPSULE | Refills: 0 | Status: SHIPPED | OUTPATIENT
Start: 2022-11-23

## 2022-11-23 NOTE — TELEPHONE ENCOUNTER
Patient is due for an office visit. Please call patient to schedule an appointment in order to receive any further refills after this. Thank you. Dotsp[employer] message sent. 90 day refill given on 11/23/22, appointment needed for further refills.

## 2022-11-23 NOTE — TELEPHONE ENCOUNTER
Protocol failed. 90 day refill given on 11/23/22, appointment needed for further refills.       Requested Prescriptions   Pending Prescriptions Disp Refills    DILTIAZEM HCL ER COATED BEADS 180 MG Oral Capsule SR 24 Hr [Pharmacy Med Name: DILTIAZEM 24H ER(CD) 180 MG CP] 30 capsule 2     Sig: TAKE 1 CAPSULE BY MOUTH EVERY DAY       Hypertensive Medications Protocol Failed - 11/23/2022  7:34 AM        Failed - In person appointment or virtual visit in the past 6 months     Recent Outpatient Visits              7 months ago Urinary urgency    [place] [facility], [place] , [place],     Office Visit    1 year ago Stress at work    [place] [facility] [facility], [facility] , Sed[place] Orlando DO    Telemedicine    1 year ago Colon cancer screening    [facility], [place] , [place] Orlando DO    Office Visit    2 years ago Passage of loose stools    [facility], [facility] , Sedrick Orlando,     Telemedicine    2 years ago Administrative encounter    [address] [place] [address] - In person appointment in the past 12 or next 3 months     Recent Outpatient Visits              7 months ago Urinary urgency    [place] [facility], [place] , Sedrick Orlando,     Office Visit    1 year ago Stress at work    [place] [facility] [facility], [place] , Sed[place] Orlando, [address] [place] [address]    1 year ago [address], [facility] , [place], [address] Visit    2 years ago Passage of loose stools    [place] [facility], [facility] , [place],     Telemedicine    2 years ago Administrative encounter    [address], LISET Hunter    E-Visit                      Passed - Last BP reading less than 140/90     BP Readings from Last 1 Encounters:  08/08/22 : 132/80              Passed - CMP or BMP in past 6 months Recent Results (from the past 4392 hour(s))   Comp Metabolic Panel (14)    Collection Time: 08/08/22  5:19 PM   Result Value Ref Range    Glucose 116 (H) 70 - 99 mg/dL    Sodium 140 136 - 145 mmol/L    Potassium 3.5 3.5 - 5.1 mmol/L    Chloride 106 98 - 112 mmol/L    CO2 29.0 21.0 - 32.0 mmol/L    Anion Gap 5 0 - 18 mmol/L    BUN 9 7 - 18 mg/dL    Creatinine 1.15 0.70 - 1.30 mg/dL    BUN/CREA Ratio 7.8 (L) 10.0 - 20.0    Calcium, Total 8.7 8.5 - 10.1 mg/dL    Calculated Osmolality 290 275 - 295 mOsm/kg    eGFR-Cr 75 >=60 mL/min/1.73m2    ALT 47 16 - 61 U/L    AST 41 (H) 15 - 37 U/L    Alkaline Phosphatase 78 45 - 117 U/L    Bilirubin, Total 0.6 0.1 - 2.0 mg/dL    Total Protein 7.7 6.4 - 8.2 g/dL    Albumin 3.5 3.4 - 5.0 g/dL    Globulin  4.2 2.8 - 4.4 g/dL    A/G Ratio 0.8 (L) 1.0 - 2.0     *Note: Due to a large number of results and/or encounters for the requested time period, some results have not been displayed. A complete set of results can be found in Results Review.                Passed - EGFRCR or GFRAA > 50     GFR Evaluation  EGFRCR: 75 , resulted on 8/8/2022                   Recent Outpatient Visits              7 months ago Urinary urgency    [place] [employer], [employer], [place], DO    Office Visit    1 year ago Stress at work    [place] [employer] [facility], [employer] [address], [place] Apoloniamarkos Villareal, DO    Telemedicine    1 year ago Colon cancer screening    [facility], [facility] , [place] Apoloniamarkos Villareal, DO    Office Visit    2 years ago Passage of loose stools    [facility], [facility], [place] Apoloniamarkos Villareal, DO    Telemedicine    2 years ago Administrative encounter    [address] LISET Lopez    E-Visit

## 2022-11-24 NOTE — TELEPHONE ENCOUNTER
Last ref 10-26-22 by Joel Gresham PA-C  pls advise, thanks in advance. Please review refill protocol failed/ no protocol  Requested Prescriptions   Pending Prescriptions Disp Refills    alprazolam 2 MG Oral Tab 30 tablet 0     Sig: Take 1 tablet (2 mg total) by mouth nightly as needed.        There is no refill protocol information for this order

## 2022-11-25 ENCOUNTER — NURSE TRIAGE (OUTPATIENT)
Dept: FAMILY MEDICINE CLINIC | Facility: CLINIC | Age: 57
End: 2022-11-25

## 2022-11-25 RX ORDER — ALPRAZOLAM 2 MG/1
2 TABLET ORAL NIGHTLY PRN
Qty: 30 TABLET | Refills: 0 | Status: SHIPPED | OUTPATIENT
Start: 2022-11-25

## 2023-02-02 NOTE — TELEPHONE ENCOUNTER
Please review. Protocol Failed or has no protocol   Requested Prescriptions   Pending Prescriptions Disp Refills    alprazolam 2 MG Oral Tab 30 tablet 0     Sig: Take 1 tablet (2 mg total) by mouth nightly as needed.        There is no refill protocol information for this order         Recent Outpatient Visits              10 months ago Urinary urgency    [address], [place]    Office Visit    1 year ago Stress at work    [address], [place] [address], [facility],     Telemedicine    1 year ago Colon cancer screening    [address],     Office Visit    2 years ago Passage of loose stools    [facility], [place] [address], [facility],     Telemedicine    2 years ago Administrative encounter    [facility], [facility]    E-Visit          Future Appointments         Provider Department Appt Notes    In 2 weeks Ni Schmitz, DO [address], [place] [address], [facility] Chest pains (see triage note, sooner appt made) do not cancel this physical appt pediatric hospitalist

## 2023-02-04 RX ORDER — ALPRAZOLAM 2 MG/1
2 TABLET ORAL NIGHTLY PRN
Qty: 30 TABLET | Refills: 0 | Status: SHIPPED | OUTPATIENT
Start: 2023-02-04

## 2023-02-20 ENCOUNTER — LAB ENCOUNTER (OUTPATIENT)
Dept: LAB | Age: 58
End: 2023-02-20
Attending: FAMILY MEDICINE
Payer: COMMERCIAL

## 2023-02-20 ENCOUNTER — OFFICE VISIT (OUTPATIENT)
Dept: FAMILY MEDICINE CLINIC | Facility: CLINIC | Age: 58
End: 2023-02-20

## 2023-02-20 VITALS
TEMPERATURE: 98 F | SYSTOLIC BLOOD PRESSURE: 119 MMHG | HEART RATE: 92 BPM | DIASTOLIC BLOOD PRESSURE: 82 MMHG | HEIGHT: 71 IN | BODY MASS INDEX: 28.56 KG/M2 | WEIGHT: 204 LBS

## 2023-02-20 DIAGNOSIS — R94.31 EKG ABNORMALITIES: ICD-10-CM

## 2023-02-20 DIAGNOSIS — R07.89 CHEST PRESSURE: ICD-10-CM

## 2023-02-20 DIAGNOSIS — M99.02 THORACIC REGION SOMATIC DYSFUNCTION: ICD-10-CM

## 2023-02-20 DIAGNOSIS — I10 ESSENTIAL HYPERTENSION: ICD-10-CM

## 2023-02-20 DIAGNOSIS — G62.9 SENSORY NEUROPATHY: ICD-10-CM

## 2023-02-20 DIAGNOSIS — Z00.00 ROUTINE PHYSICAL EXAMINATION: Primary | ICD-10-CM

## 2023-02-20 LAB
ATRIAL RATE: 67 BPM
P AXIS: 31 DEGREES
P-R INTERVAL: 154 MS
Q-T INTERVAL: 376 MS
QRS DURATION: 80 MS
QTC CALCULATION (BEZET): 397 MS
R AXIS: -31 DEGREES
T AXIS: 120 DEGREES
VENTRICULAR RATE: 67 BPM

## 2023-02-20 RX ORDER — AMLODIPINE BESYLATE 10 MG/1
10 TABLET ORAL DAILY
Qty: 90 TABLET | Refills: 1 | Status: SHIPPED | OUTPATIENT
Start: 2023-02-20 | End: 2023-08-19

## 2023-02-20 RX ORDER — AMLODIPINE BESYLATE 5 MG/1
5 TABLET ORAL DAILY
Qty: 30 TABLET | Refills: 0 | Status: SHIPPED | OUTPATIENT
Start: 2023-02-20

## 2023-02-20 NOTE — PATIENT INSTRUCTIONS
All adult screening ordered and done appropriate for patient's age and gender and risk factors and complaints. Encouraged physical fitness and daily physical activity daily. Monitor blood pressures and record at home. Limit salt intake. Medication reviewed and renewed where needed and appropriate. Comply with medications. To physiatry for right elbow discomfort. Amlodipine being reduced to 5 mg orally daily. Patient encouraged to continue with using. Patient has been applauded for the cessation of tobacco use. OMT done.

## 2023-02-21 LAB
ABSOLUTE BASOPHILS: 63 CELLS/UL (ref 0–200)
ABSOLUTE EOSINOPHILS: 120 CELLS/UL (ref 15–500)
ABSOLUTE LYMPHOCYTES: 1927 CELLS/UL (ref 850–3900)
ABSOLUTE MONOCYTES: 678 CELLS/UL (ref 200–950)
ABSOLUTE NEUTROPHILS: 2913 CELLS/UL (ref 1500–7800)
ALBUMIN/GLOBULIN RATIO: 1.5 (CALC) (ref 1–2.5)
ALBUMIN: 4.3 G/DL (ref 3.6–5.1)
ALKALINE PHOSPHATASE: 60 U/L (ref 35–144)
ALT: 31 U/L (ref 9–46)
AST: 26 U/L (ref 10–35)
BASOPHILS: 1.1 %
BILIRUBIN, TOTAL: 0.9 MG/DL (ref 0.2–1.2)
BILIRUBIN: NEGATIVE
BUN: 10 MG/DL (ref 7–25)
CALCIUM: 9.3 MG/DL (ref 8.6–10.3)
CARBON DIOXIDE: 26 MMOL/L (ref 20–32)
CHLORIDE: 104 MMOL/L (ref 98–110)
CHOL/HDLC RATIO: 3.7 (CALC)
CHOLESTEROL, TOTAL: 232 MG/DL
CREATININE: 1.14 MG/DL (ref 0.7–1.3)
EGFR: 75 ML/MIN/1.73M2
EOSINOPHILS: 2.1 %
GLOBULIN: 2.9 G/DL (CALC) (ref 1.9–3.7)
GLUCOSE: 117 MG/DL (ref 65–99)
GLUCOSE: NEGATIVE
HDL CHOLESTEROL: 63 MG/DL
HEMATOCRIT: 50 % (ref 38.5–50)
HEMOGLOBIN: 17.2 G/DL (ref 13.2–17.1)
LDL-CHOLESTEROL: 146 MG/DL (CALC)
LEUKOCYTE ESTERASE: NEGATIVE
LYMPHOCYTES: 33.8 %
MCH: 29.5 PG (ref 27–33)
MCHC: 34.4 G/DL (ref 32–36)
MCV: 85.6 FL (ref 80–100)
MONOCYTES: 11.9 %
MPV: 10.6 FL (ref 7.5–12.5)
NEUTROPHILS: 51.1 %
NITRITE: NEGATIVE
NON-HDL CHOLESTEROL: 169 MG/DL (CALC)
OCCULT BLOOD: NEGATIVE
PLATELET COUNT: 130 THOUSAND/UL (ref 140–400)
POTASSIUM: 3.6 MMOL/L (ref 3.5–5.3)
PROTEIN, TOTAL: 7.2 G/DL (ref 6.1–8.1)
PSA, TOTAL: 0.45 NG/ML
RDW: 13 % (ref 11–15)
RED BLOOD CELL COUNT: 5.84 MILLION/UL (ref 4.2–5.8)
SODIUM: 140 MMOL/L (ref 135–146)
SPECIFIC GRAVITY: 1.02 (ref 1–1.03)
TRIGLYCERIDES: 117 MG/DL
TSH W/REFLEX TO FT4: 1.61 MIU/L (ref 0.4–4.5)
WHITE BLOOD CELL COUNT: 5.7 THOUSAND/UL (ref 3.8–10.8)

## 2023-02-22 DIAGNOSIS — R73.09 ELEVATED RANDOM BLOOD GLUCOSE LEVEL: Primary | ICD-10-CM

## 2023-03-20 DIAGNOSIS — F51.04 PSYCHOPHYSIOLOGICAL INSOMNIA: ICD-10-CM

## 2023-03-21 RX ORDER — ZOLPIDEM TARTRATE 12.5 MG/1
12.5 TABLET, FILM COATED, EXTENDED RELEASE ORAL NIGHTLY PRN
Qty: 30 TABLET | Refills: 1 | Status: SHIPPED | OUTPATIENT
Start: 2023-03-21

## 2023-03-22 NOTE — TELEPHONE ENCOUNTER
Please review; no protocol  Medication pended for your review and approval.     Requested Prescriptions   Pending Prescriptions Disp Refills    Zolpidem Tartrate ER 12.5 MG Oral Tab CR 30 tablet 1     Sig: Take 1 tablet (12.5 mg total) by mouth nightly as needed for Sleep.        There is no refill protocol information for this order

## 2023-04-04 RX ORDER — DILTIAZEM HYDROCHLORIDE 180 MG/1
180 CAPSULE, COATED, EXTENDED RELEASE ORAL DAILY
Qty: 90 CAPSULE | Refills: 3 | Status: SHIPPED | OUTPATIENT
Start: 2023-04-04

## 2023-04-04 NOTE — TELEPHONE ENCOUNTER
Refill passed per [employer] protocol. Requested Prescriptions   Pending Prescriptions Disp Refills    dilTIAZem HCl ER Coated Beads 180 MG Oral Capsule SR 24 Hr 90 capsule 0     Sig: Take 1 capsule (180 mg total) by mouth daily. Hypertensive Medications Protocol Passed - 4/3/2023  4:37 PM        Passed - In person appointment in the past 12 or next 3 months     Recent Outpatient Visits              1 month ago Routine physical examination    [place], [place], [place]    Office Visit    1 year ago Urinary urgency    [facility] [address],     Office Visit    1 year ago Stress at work    [facility] [address] [place] [address], [place], [place], DO    Telemedicine    1 year ago Colon cancer screening    [place], [place], DO    Office Visit    2 years ago Passage of loose stools    [facility], [address],     Telemedicine                      Passed - Last BP reading less than 140/90     BP Readings from Last 1 Encounters:  02/20/23 : 119/82              Passed - CMP or BMP in past 6 months     Recent Results (from the past 4392 hour(s))   COMP METABOLIC PANEL (14)    Collection Time: 02/20/23  2:38 PM   Result Value Ref Range    GLUCOSE 117 (H) 65 - 99 mg/dL     Comment:               Fasting reference interval     For someone without known diabetes, a glucose value  between 100 and 125 mg/dL is consistent with  prediabetes and should be confirmed with a  follow-up test.         UREA NITROGEN (BUN) 10 7 - 25 mg/dL    CREATININE 1.14 0.70 - 1.30 mg/dL    EGFR 75 > OR = 60 mL/min/1.73m2     Comment: The eGFR is based on the CKD-EPI 2021 equation.  To calculate   the new eGFR from a previous Creatinine or Cystatin C  result, go to [email or web address]  [email or web address]      BUN/CREATININE RATIO NOT APPLICABLE 6 - 22 (calc)    SODIUM 140 135 - 146 mmol/L    POTASSIUM 3.6 3.5 - 5.3 mmol/L    CHLORIDE 104 98 - 110 mmol/L    CARBON DIOXIDE 26 20 - 32 mmol/L    CALCIUM 9.3 8.6 - 10.3 mg/dL    PROTEIN, TOTAL 7.2 6.1 - 8.1 g/dL    ALBUMIN 4.3 3.6 - 5.1 g/dL    GLOBULIN 2.9 1.9 - 3.7 g/dL (calc)    ALBUMIN/GLOBULIN RATIO 1.5 1.0 - 2.5 (calc)    BILIRUBIN, TOTAL 0.9 0.2 - 1.2 mg/dL    ALKALINE PHOSPHATASE 60 35 - 144 U/L    AST 26 10 - 35 U/L    ALT 31 9 - 46 U/L     *Note: Due to a large number of results and/or encounters for the requested time period, some results have not been displayed. A complete set of results can be found in Results Review.                Passed - In person appointment or virtual visit in the past 6 months     Recent Outpatient Visits              1 month ago Routine physical examination    [address], [place]    Office Visit    1 year ago Urinary urgency    [address] [facility],     Office Visit    1 year ago Stress at work    [facility], [address],     Telemedicine    1 year ago Colon cancer screening    [address] [facility],     Office Visit    2 years ago Passage of loose stools    [facility], [address] [place] , DO    [address] [place] [address] or GFRAA > 50     GFR Evaluation  EGFRCR: 75 , resulted on 8/8/2022             Recent Outpatient Visits              1 month ago Routine physical examination    [address],     Office Visit    1 year ago Urinary urgency    [facility], [address]    Office Visit    1 year ago Stress at work    [address], [place] [address], [place], Ninfa Portillo, DO    Telemedicine    1 year ago Colon cancer screening    [address] [facility], Ninfa Portillo,     Office Visit    2 years ago Passage of loose stools    [facility], [place] [address], [place], Ninfa Portillo, [facility]

## 2023-04-25 NOTE — TELEPHONE ENCOUNTER
No protocol for requested medication. Please advise on refill request.      Requested Prescriptions     Pending Prescriptions Disp Refills    alprazolam 2 MG Oral Tab 30 tablet 0     Sig: Take 1 tablet (2 mg total) by mouth nightly as needed. Recent Visits  Date Type Provider Dept   02/20/23 Office Visit Josee Paul DO [facility]   03/29/22 Office Visit Josee Paul DO [facility]   Showing recent visits within past 540 days with a meds authorizing provider and meeting all other requirements  Future Appointments  No visits were found meeting these conditions. Showing future appointments within next 150 days with a meds authorizing provider and meeting all other requirements     Requested Prescriptions   Pending Prescriptions Disp Refills    alprazolam 2 MG Oral Tab 30 tablet 0     Sig: Take 1 tablet (2 mg total) by mouth nightly as needed.        There is no refill protocol information for this order            Recent Outpatient Visits              2 months ago Routine physical examination    Darion Katz, [place], [place]    Office Visit    1 year ago Urinary urgency    Darion Katz Rosemary Bravo, DO    Office Visit    1 year ago Stress at work    [address], [address],     Telemedicine    1 year ago Colon cancer screening    Darion Katz Rosemary Bravo, DO    Office Visit    2 years ago Passage of loose stools    [facility], [address]    Telemedicine

## 2023-04-26 RX ORDER — ALPRAZOLAM 2 MG/1
2 TABLET ORAL NIGHTLY PRN
Qty: 30 TABLET | Refills: 0 | Status: SHIPPED
Start: 2023-04-26

## 2023-05-14 DIAGNOSIS — I10 ESSENTIAL HYPERTENSION: ICD-10-CM

## 2023-05-15 RX ORDER — AMLODIPINE BESYLATE 5 MG/1
5 TABLET ORAL DAILY
Qty: 90 TABLET | Refills: 1 | Status: SHIPPED | OUTPATIENT
Start: 2023-05-15

## 2023-05-15 NOTE — TELEPHONE ENCOUNTER
Refill passed per [employer] protocol. Looks like per office notes dosage was decreased to 5mg but only a 30 days supply was sent on 2/20/2023 along with amlodipine 10mg.   Requested Prescriptions   Pending Prescriptions Disp Refills    AMLODIPINE 5 MG Oral Tab [Pharmacy Med Name: AMLODIPINE BESYLATE 5MG TABLETS] 30 tablet 0     Sig: TAKE 1 TABLET(5 MG) BY MOUTH DAILY       Hypertensive Medications Protocol Passed - 5/14/2023  3:48 PM        Passed - In person appointment in the past 12 or next 3 months     Recent Outpatient Visits              2 months ago Routine physical examination    [address], [place]    Office Visit    1 year ago Urinary urgency    [address], [place] [address], [place], [place],     Office Visit    1 year ago Stress at work    [address], [place] [address], [place], [place],     Telemedicine    1 year ago Colon cancer screening    [address], [place],     Office Visit    2 years ago Passage of loose stools    [facility], [place] [address], [place], [facility],     Telemedicine                      Passed - Last BP reading less than 140/90     BP Readings from Last 1 Encounters:  02/20/23 : 119/82                Passed - CMP or BMP in past 6 months     Recent Results (from the past 4392 hour(s))   COMP METABOLIC PANEL (14)    Collection Time: 02/20/23  2:38 PM   Result Value Ref Range    GLUCOSE 117 (H) 65 - 99 mg/dL     Comment:               Fasting reference interval     For someone without known diabetes, a glucose value  between 100 and 125 mg/dL is consistent with  prediabetes and should be confirmed with a  follow-up test.         UREA NITROGEN (BUN) 10 7 - 25 mg/dL    CREATININE 1.14 0.70 - 1.30 mg/dL    EGFR 75 > OR = 60 mL/min/1.73m2     Comment: The eGFR is based on the CKD-EPI 2021 equation. To calculate   the new eGFR from a previous Creatinine or Cystatin C  result, go to [email or web address] [email or web address]/  [email or web address]/[email or web address]      BUN/CREATININE RATIO NOT APPLICABLE 6 - 22 (calc)    SODIUM 140 135 - 146 mmol/L    POTASSIUM 3.6 3.5 - 5.3 mmol/L    CHLORIDE 104 98 - 110 mmol/L    CARBON DIOXIDE 26 20 - 32 mmol/L    CALCIUM 9.3 8.6 - 10.3 mg/dL    PROTEIN, TOTAL 7.2 6.1 - 8.1 g/dL    ALBUMIN 4.3 3.6 - 5.1 g/dL    GLOBULIN 2.9 1.9 - 3.7 g/dL (calc)    ALBUMIN/GLOBULIN RATIO 1.5 1.0 - 2.5 (calc)    BILIRUBIN, TOTAL 0.9 0.2 - 1.2 mg/dL    ALKALINE PHOSPHATASE 60 35 - 144 U/L    AST 26 10 - 35 U/L    ALT 31 9 - 46 U/L     *Note: Due to a large number of results and/or encounters for the requested time period, some results have not been displayed. A complete set of results can be found in Results Review.                  Passed - In person appointment or virtual visit in the past 6 months     Recent Outpatient Visits              2 months ago Routine physical examination    [address], [place]    Office Visit    1 year ago Urinary urgency    [id number] [place] [address],     Office Visit    1 year ago Stress at work    Tay Lopez, [address],     Telemedicine    1 year ago Colon cancer screening    [address] [place] [address],     Office Visit    2 years ago Passage of loose stools    [facility], [address], [place] J, DO    [facility] or GFRAA > 50     GFR Evaluation  EGFRCR: 75 , resulted on 8/8/2022                 Recent Outpatient Visits              2 months ago Routine physical examination    [address] [place], [place]    Office Visit    1 year ago Urinary urgency    [facility], [place] [address], [place], [place]    Office Visit    1 year ago Stress at work    [address], [address], [facility],     Telemedicine    1 year ago Colon cancer screening    [address] [facility],     Office Visit    2 years ago Passage of loose stools    [facility], [place] [address], [place], [facility], [place]    Telemedicine

## 2023-05-30 ENCOUNTER — LAB ENCOUNTER (OUTPATIENT)
Dept: LAB | Age: 58
End: 2023-05-30
Attending: PHYSICIAN ASSISTANT
Payer: COMMERCIAL

## 2023-05-30 ENCOUNTER — OFFICE VISIT (OUTPATIENT)
Dept: FAMILY MEDICINE CLINIC | Facility: CLINIC | Age: 58
End: 2023-05-30

## 2023-05-30 VITALS
HEIGHT: 71 IN | WEIGHT: 203 LBS | DIASTOLIC BLOOD PRESSURE: 84 MMHG | HEART RATE: 80 BPM | BODY MASS INDEX: 28.42 KG/M2 | SYSTOLIC BLOOD PRESSURE: 128 MMHG

## 2023-05-30 DIAGNOSIS — E55.9 VITAMIN D DEFICIENCY: ICD-10-CM

## 2023-05-30 DIAGNOSIS — R73.09 ELEVATED RANDOM BLOOD GLUCOSE LEVEL: ICD-10-CM

## 2023-05-30 DIAGNOSIS — R53.83 FATIGUE, UNSPECIFIED TYPE: ICD-10-CM

## 2023-05-30 DIAGNOSIS — N63.21 LUMP IN UPPER OUTER QUADRANT OF LEFT BREAST: Primary | ICD-10-CM

## 2023-05-30 LAB
EST. AVERAGE GLUCOSE BLD GHB EST-MCNC: 160 MG/DL (ref 68–126)
HBA1C MFR BLD: 7.2 % (ref ?–5.7)
VIT B12 SERPL-MCNC: 273 PG/ML (ref 193–986)
VIT D+METAB SERPL-MCNC: 7.3 NG/ML (ref 30–100)

## 2023-05-30 PROCEDURE — 3074F SYST BP LT 130 MM HG: CPT | Performed by: PHYSICIAN ASSISTANT

## 2023-05-30 PROCEDURE — 3008F BODY MASS INDEX DOCD: CPT | Performed by: PHYSICIAN ASSISTANT

## 2023-05-30 PROCEDURE — 82607 VITAMIN B-12: CPT

## 2023-05-30 PROCEDURE — 36415 COLL VENOUS BLD VENIPUNCTURE: CPT

## 2023-05-30 PROCEDURE — 3079F DIAST BP 80-89 MM HG: CPT | Performed by: PHYSICIAN ASSISTANT

## 2023-05-30 PROCEDURE — 83036 HEMOGLOBIN GLYCOSYLATED A1C: CPT

## 2023-05-30 PROCEDURE — 3051F HG A1C>EQUAL 7.0%<8.0%: CPT

## 2023-05-30 PROCEDURE — 82306 VITAMIN D 25 HYDROXY: CPT

## 2023-05-30 PROCEDURE — 99213 OFFICE O/P EST LOW 20 MIN: CPT | Performed by: PHYSICIAN ASSISTANT

## 2023-06-06 DIAGNOSIS — E11.9 NEW ONSET TYPE 2 DIABETES MELLITUS (HCC): Primary | ICD-10-CM

## 2023-06-12 ENCOUNTER — OFFICE VISIT (OUTPATIENT)
Dept: ENDOCRINOLOGY CLINIC | Facility: CLINIC | Age: 58
End: 2023-06-12

## 2023-06-12 VITALS
SYSTOLIC BLOOD PRESSURE: 133 MMHG | HEART RATE: 54 BPM | HEIGHT: 70.98 IN | DIASTOLIC BLOOD PRESSURE: 82 MMHG | BODY MASS INDEX: 28.42 KG/M2 | WEIGHT: 203 LBS

## 2023-06-12 DIAGNOSIS — E11.65 UNCONTROLLED TYPE 2 DIABETES MELLITUS WITH HYPERGLYCEMIA (HCC): Primary | ICD-10-CM

## 2023-06-12 PROCEDURE — 3079F DIAST BP 80-89 MM HG: CPT

## 2023-06-12 PROCEDURE — 3008F BODY MASS INDEX DOCD: CPT

## 2023-06-12 PROCEDURE — 99204 OFFICE O/P NEW MOD 45 MIN: CPT

## 2023-06-12 PROCEDURE — 3075F SYST BP GE 130 - 139MM HG: CPT

## 2023-06-12 RX ORDER — LANCETS 33 GAUGE
EACH MISCELLANEOUS
Qty: 200 EACH | Refills: 0 | Status: SHIPPED | OUTPATIENT
Start: 2023-06-12

## 2023-06-12 RX ORDER — BLOOD-GLUCOSE METER
1 EACH MISCELLANEOUS DAILY
Qty: 1 KIT | Refills: 0 | Status: SHIPPED | OUTPATIENT
Start: 2023-06-12

## 2023-06-12 RX ORDER — BLOOD SUGAR DIAGNOSTIC
STRIP MISCELLANEOUS
Qty: 200 STRIP | Refills: 0 | Status: SHIPPED | OUTPATIENT
Start: 2023-06-12

## 2023-07-02 RX ORDER — ALPRAZOLAM 2 MG/1
2 TABLET ORAL NIGHTLY PRN
Qty: 30 TABLET | Refills: 0 | Status: SHIPPED | OUTPATIENT
Start: 2023-07-02

## 2023-07-17 RX ORDER — BLOOD SUGAR DIAGNOSTIC
STRIP MISCELLANEOUS
Qty: 200 STRIP | Refills: 0 | Status: SHIPPED | OUTPATIENT
Start: 2023-07-17

## 2023-07-17 NOTE — TELEPHONE ENCOUNTER
LOV;06/12/23    RTC;2months    FU; No FU    Pending Monthly Supply;order pending, approve if appropriate.

## 2023-08-12 ENCOUNTER — HOSPITAL ENCOUNTER (EMERGENCY)
Facility: HOSPITAL | Age: 58
Discharge: HOME | End: 2023-08-12
Attending: EMERGENCY MEDICINE
Payer: COMMERCIAL

## 2023-08-12 ENCOUNTER — APPOINTMENT (OUTPATIENT)
Dept: GENERAL RADIOLOGY | Facility: HOSPITAL | Age: 58
End: 2023-08-12
Attending: EMERGENCY MEDICINE
Payer: COMMERCIAL

## 2023-08-12 VITALS
WEIGHT: 194 LBS | BODY MASS INDEX: 27.77 KG/M2 | HEART RATE: 51 BPM | SYSTOLIC BLOOD PRESSURE: 128 MMHG | TEMPERATURE: 98 F | HEIGHT: 70 IN | RESPIRATION RATE: 16 BRPM | DIASTOLIC BLOOD PRESSURE: 89 MMHG | OXYGEN SATURATION: 98 %

## 2023-08-12 DIAGNOSIS — F12.10 TETRAHYDROCANNABINOL (THC) USE DISORDER, MILD, ABUSE: ICD-10-CM

## 2023-08-12 DIAGNOSIS — R55 SYNCOPE AND COLLAPSE: Primary | ICD-10-CM

## 2023-08-12 LAB
ANION GAP SERPL CALC-SCNC: 5 MMOL/L (ref 0–18)
BASOPHILS # BLD AUTO: 0.03 X10(3) UL (ref 0–0.2)
BASOPHILS NFR BLD AUTO: 0.6 %
BUN BLD-MCNC: 13 MG/DL (ref 7–18)
BUN/CREAT SERPL: 9.7 (ref 10–20)
CALCIUM BLD-MCNC: 9.4 MG/DL (ref 8.5–10.1)
CHLORIDE SERPL-SCNC: 105 MMOL/L (ref 98–112)
CO2 SERPL-SCNC: 29 MMOL/L (ref 21–32)
CREAT BLD-MCNC: 1.34 MG/DL
D DIMER PPP FEU-MCNC: 0.48 UG/ML FEU (ref ?–0.57)
DEPRECATED RDW RBC AUTO: 43.5 FL (ref 35.1–46.3)
EGFRCR SERPLBLD CKD-EPI 2021: 62 ML/MIN/1.73M2 (ref 60–?)
EOSINOPHIL # BLD AUTO: 0.06 X10(3) UL (ref 0–0.7)
EOSINOPHIL NFR BLD AUTO: 1.2 %
ERYTHROCYTE [DISTWIDTH] IN BLOOD BY AUTOMATED COUNT: 13.4 % (ref 11–15)
GLUCOSE BLD-MCNC: 152 MG/DL (ref 70–99)
GLUCOSE BLDC GLUCOMTR-MCNC: 147 MG/DL (ref 70–99)
HCT VFR BLD AUTO: 48.4 %
HGB BLD-MCNC: 16.7 G/DL
IMM GRANULOCYTES # BLD AUTO: 0 X10(3) UL (ref 0–1)
IMM GRANULOCYTES NFR BLD: 0 %
LYMPHOCYTES # BLD AUTO: 1.07 X10(3) UL (ref 1–4)
LYMPHOCYTES NFR BLD AUTO: 22 %
MCH RBC QN AUTO: 30.8 PG (ref 26–34)
MCHC RBC AUTO-ENTMCNC: 34.5 G/DL (ref 31–37)
MCV RBC AUTO: 89.1 FL
MONOCYTES # BLD AUTO: 0.56 X10(3) UL (ref 0.1–1)
MONOCYTES NFR BLD AUTO: 11.5 %
NEUTROPHILS # BLD AUTO: 3.15 X10 (3) UL (ref 1.5–7.7)
NEUTROPHILS # BLD AUTO: 3.15 X10(3) UL (ref 1.5–7.7)
NEUTROPHILS NFR BLD AUTO: 64.7 %
OSMOLALITY SERPL CALC.SUM OF ELEC: 291 MOSM/KG (ref 275–295)
PLATELET # BLD AUTO: 154 10(3)UL (ref 150–450)
POTASSIUM SERPL-SCNC: 4.1 MMOL/L (ref 3.5–5.1)
RBC # BLD AUTO: 5.43 X10(6)UL
SODIUM SERPL-SCNC: 139 MMOL/L (ref 136–145)
TROPONIN I HIGH SENSITIVITY: 7 NG/L
TROPONIN I HIGH SENSITIVITY: 7 NG/L
WBC # BLD AUTO: 4.9 X10(3) UL (ref 4–11)

## 2023-08-12 PROCEDURE — 93010 ELECTROCARDIOGRAM REPORT: CPT

## 2023-08-12 PROCEDURE — 96360 HYDRATION IV INFUSION INIT: CPT

## 2023-08-12 PROCEDURE — 96361 HYDRATE IV INFUSION ADD-ON: CPT

## 2023-08-12 PROCEDURE — 99285 EMERGENCY DEPT VISIT HI MDM: CPT

## 2023-08-12 PROCEDURE — 85025 COMPLETE CBC W/AUTO DIFF WBC: CPT | Performed by: EMERGENCY MEDICINE

## 2023-08-12 PROCEDURE — 84484 ASSAY OF TROPONIN QUANT: CPT | Performed by: EMERGENCY MEDICINE

## 2023-08-12 PROCEDURE — 71045 X-RAY EXAM CHEST 1 VIEW: CPT | Performed by: EMERGENCY MEDICINE

## 2023-08-12 PROCEDURE — 82962 GLUCOSE BLOOD TEST: CPT

## 2023-08-12 PROCEDURE — 85379 FIBRIN DEGRADATION QUANT: CPT | Performed by: EMERGENCY MEDICINE

## 2023-08-12 PROCEDURE — 93005 ELECTROCARDIOGRAM TRACING: CPT

## 2023-08-12 PROCEDURE — 80048 BASIC METABOLIC PNL TOTAL CA: CPT | Performed by: EMERGENCY MEDICINE

## 2023-08-13 LAB
ATRIAL RATE: 58 BPM
P AXIS: 38 DEGREES
P-R INTERVAL: 158 MS
Q-T INTERVAL: 470 MS
QRS DURATION: 80 MS
QTC CALCULATION (BEZET): 437 MS
R AXIS: -31 DEGREES
T AXIS: 154 DEGREES
VENTRICULAR RATE: 52 BPM

## 2023-08-13 NOTE — ED INITIAL ASSESSMENT (HPI)
Pt arrived ambulatory to ED from a restaurant. Pt passed out, diapharetic, and became unresponsive. Bystander begin chest compression without checking for pulse. Pt reported recent diagnosis of DM not taking meds for it. Reports CP pain d/t compressions.  Pt appears drowsy

## 2023-09-08 DIAGNOSIS — F51.04 PSYCHOPHYSIOLOGICAL INSOMNIA: ICD-10-CM

## 2023-09-08 RX ORDER — ZOLPIDEM TARTRATE 12.5 MG/1
12.5 TABLET, FILM COATED, EXTENDED RELEASE ORAL NIGHTLY PRN
Qty: 30 TABLET | Refills: 1 | OUTPATIENT
Start: 2023-09-08

## 2023-09-10 NOTE — TELEPHONE ENCOUNTER
Please review. Protocol failed / No Protocol. Requested Prescriptions   Pending Prescriptions Disp Refills    alprazolam 2 MG Oral Tab 30 tablet 0     Sig: Take 1 tablet (2 mg total) by mouth nightly as needed.        There is no refill protocol information for this order

## 2023-09-11 RX ORDER — ALPRAZOLAM 2 MG/1
2 TABLET ORAL NIGHTLY PRN
Qty: 30 TABLET | Refills: 0 | Status: SHIPPED | OUTPATIENT
Start: 2023-09-11

## 2023-09-12 RX ORDER — LANCETS 33 GAUGE
EACH MISCELLANEOUS
Qty: 200 EACH | Refills: 0 | Status: SHIPPED | OUTPATIENT
Start: 2023-09-12

## 2023-09-27 DIAGNOSIS — F51.04 PSYCHOPHYSIOLOGICAL INSOMNIA: ICD-10-CM

## 2023-09-28 RX ORDER — ZOLPIDEM TARTRATE 12.5 MG/1
12.5 TABLET, FILM COATED, EXTENDED RELEASE ORAL NIGHTLY PRN
Qty: 30 TABLET | Refills: 1 | Status: SHIPPED | OUTPATIENT
Start: 2023-09-28

## 2023-09-28 NOTE — TELEPHONE ENCOUNTER
Please review refill protocol failed/ no protocol  Requested Prescriptions   Pending Prescriptions Disp Refills    Zolpidem Tartrate ER 12.5 MG Oral Tab CR 30 tablet 1     Sig: Take 1 tablet (12.5 mg total) by mouth nightly as needed for Sleep.        There is no refill protocol information for this order

## 2023-09-30 DIAGNOSIS — I10 ESSENTIAL HYPERTENSION: ICD-10-CM

## 2023-10-02 RX ORDER — AMLODIPINE BESYLATE 5 MG/1
5 TABLET ORAL DAILY
Qty: 90 TABLET | Refills: 1 | OUTPATIENT
Start: 2023-10-02

## 2023-10-19 ENCOUNTER — OFFICE VISIT (OUTPATIENT)
Dept: FAMILY MEDICINE CLINIC | Facility: CLINIC | Age: 58
End: 2023-10-19

## 2023-10-19 ENCOUNTER — TELEPHONE (OUTPATIENT)
Dept: FAMILY MEDICINE CLINIC | Facility: CLINIC | Age: 58
End: 2023-10-19

## 2023-10-19 VITALS
TEMPERATURE: 98 F | DIASTOLIC BLOOD PRESSURE: 88 MMHG | SYSTOLIC BLOOD PRESSURE: 138 MMHG | HEIGHT: 70 IN | BODY MASS INDEX: 28.92 KG/M2 | HEART RATE: 103 BPM | WEIGHT: 202 LBS

## 2023-10-19 DIAGNOSIS — Z28.21 PNEUMOCOCCAL VACCINATION DECLINED BY PATIENT: ICD-10-CM

## 2023-10-19 DIAGNOSIS — I10 ESSENTIAL HYPERTENSION: ICD-10-CM

## 2023-10-19 DIAGNOSIS — Z28.21 INFLUENZA VACCINATION DECLINED BY PATIENT: ICD-10-CM

## 2023-10-19 DIAGNOSIS — Z28.21 HERPES ZOSTER VACCINATION DECLINED: ICD-10-CM

## 2023-10-19 DIAGNOSIS — E11.9 TYPE 2 DIABETES MELLITUS WITHOUT COMPLICATION, WITHOUT LONG-TERM CURRENT USE OF INSULIN (HCC): Primary | ICD-10-CM

## 2023-10-19 DIAGNOSIS — Z28.21 TETANUS, DIPHTHERIA, AND ACELLULAR PERTUSSIS (TDAP) VACCINATION DECLINED: ICD-10-CM

## 2023-10-19 LAB
CARTRIDGE LOT#: 650 NUMERIC
HEMOGLOBIN A1C: 6.4 % (ref 4.3–5.6)

## 2023-10-19 PROCEDURE — 83036 HEMOGLOBIN GLYCOSYLATED A1C: CPT | Performed by: FAMILY MEDICINE

## 2023-10-19 PROCEDURE — 99214 OFFICE O/P EST MOD 30 MIN: CPT | Performed by: FAMILY MEDICINE

## 2023-10-19 PROCEDURE — 3079F DIAST BP 80-89 MM HG: CPT | Performed by: FAMILY MEDICINE

## 2023-10-19 PROCEDURE — 3008F BODY MASS INDEX DOCD: CPT | Performed by: FAMILY MEDICINE

## 2023-10-19 PROCEDURE — 3044F HG A1C LEVEL LT 7.0%: CPT | Performed by: FAMILY MEDICINE

## 2023-10-19 PROCEDURE — 3075F SYST BP GE 130 - 139MM HG: CPT | Performed by: FAMILY MEDICINE

## 2023-10-19 RX ORDER — SIMVASTATIN 20 MG
20 TABLET ORAL NIGHTLY
Qty: 90 TABLET | Refills: 1 | Status: SHIPPED | OUTPATIENT
Start: 2023-10-19

## 2023-10-19 NOTE — PATIENT INSTRUCTIONS
Medication reviewed and renewed where needed and appropriate. Encouraged safe physical fitness and daily physical activity daily. Comply with medications. Urine for random protein. FML update for gout. Patient to receive the same amount of days as a lot it when first approved. Lipid lowering medication recommended and lieu of diabetes diagnosis as we follow the American diabetic Association recommendations for decrease risk for strokes and heart attacks. Diltiazem has been held. Patient to continue with amlodipine 5 mg and simvastatin 20 mg to be taken at bedtime has been initiated.

## 2023-10-24 NOTE — TELEPHONE ENCOUNTER
Fmla received in forms dept. Logged for processing. Valid [facility] auth received with forms.

## 2023-11-06 NOTE — TELEPHONE ENCOUNTER
Dr. Rebecca Yi,     *The ACKNOWLEDGE button has been moved to the top right ribbon*    Please sign off on form if you agree to: Eugenio recert for HTN  (place your signature on the first page only)    -From your Inbasket, Highlight the patient and click Chart   -Double click the 35/27/66 Forms Completion telephone encounter  -Scroll down to the Media section   -Click the blue Hyperlink: Eugenio Yi 28/0[birth year]  -Click Acknowledge located in the top right ribbon/menu   -Drag the mouse into the blank space of the document and a + sign will appear. Left click to   electronically sign the document. Thank you,    Lion Clarke.

## 2023-11-07 NOTE — TELEPHONE ENCOUNTER
FMLA has been signed. Please call the patient and all other pertinent parties and let them know. Thank you.

## 2023-11-14 DIAGNOSIS — F51.04 PSYCHOPHYSIOLOGICAL INSOMNIA: ICD-10-CM

## 2023-11-15 NOTE — TELEPHONE ENCOUNTER
Please review; protocol failed. Requested Prescriptions   Pending Prescriptions Disp Refills    alprazolam 2 MG Oral Tab 30 tablet 0     Sig: Take 1 tablet (2 mg total) by mouth nightly as needed.        There is no refill protocol information for this order        Recent Outpatient Visits              3 weeks ago Type 2 diabetes mellitus without complication, without long-term current use of insulin [facility])    [address] [facility], [place], [place]    Office Visit    5 months ago Uncontrolled type 2 diabetes mellitus with hyperglycemia [facility])    [address], [facility], Consuelo Guadarrama, APRN    Office Visit    5 months ago Lump in upper outer quadrant of left breast    [facility], [address], [place], [employer]    Office Visit    8 months ago Routine physical examination    [address] [facility], Erin Reid, DO    Office Visit    1 year ago Urinary urgency    [address], [facility] [address], [place], Erin Reid, DO    Office Visit          Future Appointments         Provider Department Appt Notes    In 3 weeks Giselle Helton, APRN [address] [facility] f/u diabetes

## 2023-11-15 NOTE — TELEPHONE ENCOUNTER
Please review; protocol failed. Requested Prescriptions   Pending Prescriptions Disp Refills    Zolpidem Tartrate ER 12.5 MG Oral Tab CR 30 tablet 1     Sig: Take 1 tablet (12.5 mg total) by mouth nightly as needed for Sleep.        There is no refill protocol information for this order        Recent Outpatient Visits              3 weeks ago Type 2 diabetes mellitus without complication, without long-term current use of insulin [facility])    [address] [place]    Office Visit    5 months ago Uncontrolled type 2 diabetes mellitus with hyperglycemia [facility])    [address], [place], Sherita Guadarrama APRN    Office Visit    5 months ago Lump in upper outer quadrant of left breast    [facility], [address], [place], [employer]    Office Visit    8 months ago Routine physical examination    [address] [facility]     Office Visit    1 year ago Urinary urgency    [address], [address], [place]    Office Visit          Future Appointments         Provider Department Appt Notes    In 3 weeks LISET Hugo [address] [facility] f/u diabetes

## 2023-11-16 RX ORDER — ALPRAZOLAM 2 MG/1
2 TABLET ORAL NIGHTLY PRN
Qty: 30 TABLET | Refills: 0 | Status: SHIPPED | OUTPATIENT
Start: 2023-11-16

## 2023-11-16 RX ORDER — ZOLPIDEM TARTRATE 12.5 MG/1
12.5 TABLET, FILM COATED, EXTENDED RELEASE ORAL NIGHTLY PRN
Qty: 30 TABLET | Refills: 1 | Status: SHIPPED | OUTPATIENT
Start: 2023-11-16

## 2023-12-03 NOTE — PROCEDURES
Multiple vertebral segments in cervical and thoracic region misaligned. Manual osteopathic manipulative therapy performed and immediate relief obtained. Patient instantaneously improved. (4) no limitation

## 2023-12-22 ENCOUNTER — APPOINTMENT (OUTPATIENT)
Dept: CT IMAGING | Facility: HOSPITAL | Age: 58
End: 2023-12-22
Attending: EMERGENCY MEDICINE
Payer: COMMERCIAL

## 2023-12-22 ENCOUNTER — APPOINTMENT (OUTPATIENT)
Dept: GENERAL RADIOLOGY | Facility: HOSPITAL | Age: 58
End: 2023-12-22
Attending: EMERGENCY MEDICINE
Payer: COMMERCIAL

## 2023-12-22 ENCOUNTER — HOSPITAL ENCOUNTER (OUTPATIENT)
Age: 58
Discharge: ANOTHER HOSPITAL | End: 2023-12-22
Payer: COMMERCIAL

## 2023-12-22 ENCOUNTER — HOSPITAL ENCOUNTER (EMERGENCY)
Facility: HOSPITAL | Age: 58
Discharge: HOME | End: 2023-12-22
Attending: EMERGENCY MEDICINE
Payer: COMMERCIAL

## 2023-12-22 ENCOUNTER — NURSE TRIAGE (OUTPATIENT)
Dept: FAMILY MEDICINE CLINIC | Facility: CLINIC | Age: 58
End: 2023-12-22

## 2023-12-22 VITALS
SYSTOLIC BLOOD PRESSURE: 152 MMHG | DIASTOLIC BLOOD PRESSURE: 109 MMHG | OXYGEN SATURATION: 98 % | TEMPERATURE: 98 F | HEART RATE: 68 BPM | RESPIRATION RATE: 18 BRPM

## 2023-12-22 VITALS
HEART RATE: 67 BPM | DIASTOLIC BLOOD PRESSURE: 106 MMHG | OXYGEN SATURATION: 95 % | TEMPERATURE: 98 F | WEIGHT: 202 LBS | SYSTOLIC BLOOD PRESSURE: 154 MMHG | BODY MASS INDEX: 35.79 KG/M2 | HEIGHT: 63 IN | RESPIRATION RATE: 18 BRPM

## 2023-12-22 DIAGNOSIS — R07.9 CHEST PAIN OF UNCERTAIN ETIOLOGY: ICD-10-CM

## 2023-12-22 DIAGNOSIS — R74.01 TRANSAMINITIS: ICD-10-CM

## 2023-12-22 DIAGNOSIS — I15.9 SECONDARY HYPERTENSION: Primary | ICD-10-CM

## 2023-12-22 DIAGNOSIS — R42 DIZZINESS: ICD-10-CM

## 2023-12-22 DIAGNOSIS — R03.0 ELEVATED BLOOD PRESSURE READING: ICD-10-CM

## 2023-12-22 DIAGNOSIS — R06.00 DYSPNEA, UNSPECIFIED TYPE: ICD-10-CM

## 2023-12-22 DIAGNOSIS — R07.9 CHEST PAIN, UNSPECIFIED TYPE: Primary | ICD-10-CM

## 2023-12-22 DIAGNOSIS — R94.31 ABNORMAL EKG: ICD-10-CM

## 2023-12-22 LAB
ALBUMIN SERPL-MCNC: 4.4 G/DL (ref 3.2–4.8)
ALP LIVER SERPL-CCNC: 59 U/L
ALT SERPL-CCNC: 60 U/L
ANION GAP SERPL CALC-SCNC: 9 MMOL/L (ref 0–18)
AST SERPL-CCNC: 56 U/L (ref ?–34)
BASOPHILS # BLD AUTO: 0.04 X10(3) UL (ref 0–0.2)
BASOPHILS NFR BLD AUTO: 1 %
BILIRUB DIRECT SERPL-MCNC: 0.5 MG/DL (ref ?–0.3)
BILIRUB SERPL-MCNC: 1.4 MG/DL (ref 0.3–1.2)
BUN BLD-MCNC: 10 MG/DL (ref 9–23)
BUN/CREAT SERPL: 9.4 (ref 10–20)
CALCIUM BLD-MCNC: 9.4 MG/DL (ref 8.7–10.4)
CHLORIDE SERPL-SCNC: 102 MMOL/L (ref 98–112)
CO2 SERPL-SCNC: 24 MMOL/L (ref 21–32)
CREAT BLD-MCNC: 1.06 MG/DL
DEPRECATED RDW RBC AUTO: 40.4 FL (ref 35.1–46.3)
EGFRCR SERPLBLD CKD-EPI 2021: 81 ML/MIN/1.73M2 (ref 60–?)
EOSINOPHIL # BLD AUTO: 0.06 X10(3) UL (ref 0–0.7)
EOSINOPHIL NFR BLD AUTO: 1.5 %
ERYTHROCYTE [DISTWIDTH] IN BLOOD BY AUTOMATED COUNT: 12.8 % (ref 11–15)
GLUCOSE BLD-MCNC: 130 MG/DL (ref 70–99)
HCT VFR BLD AUTO: 48.6 %
HGB BLD-MCNC: 16.7 G/DL
IMM GRANULOCYTES # BLD AUTO: 0.01 X10(3) UL (ref 0–1)
IMM GRANULOCYTES NFR BLD: 0.2 %
LYMPHOCYTES # BLD AUTO: 1.6 X10(3) UL (ref 1–4)
LYMPHOCYTES NFR BLD AUTO: 38.9 %
MCH RBC QN AUTO: 29.7 PG (ref 26–34)
MCHC RBC AUTO-ENTMCNC: 34.4 G/DL (ref 31–37)
MCV RBC AUTO: 86.5 FL
MONOCYTES # BLD AUTO: 0.7 X10(3) UL (ref 0.1–1)
MONOCYTES NFR BLD AUTO: 17 %
NEUTROPHILS # BLD AUTO: 1.7 X10 (3) UL (ref 1.5–7.7)
NEUTROPHILS # BLD AUTO: 1.7 X10(3) UL (ref 1.5–7.7)
NEUTROPHILS NFR BLD AUTO: 41.4 %
OSMOLALITY SERPL CALC.SUM OF ELEC: 281 MOSM/KG (ref 275–295)
PLATELET # BLD AUTO: 127 10(3)UL (ref 150–450)
POTASSIUM SERPL-SCNC: 3.5 MMOL/L (ref 3.5–5.1)
PROT SERPL-MCNC: 7.7 G/DL (ref 5.7–8.2)
RBC # BLD AUTO: 5.62 X10(6)UL
SODIUM SERPL-SCNC: 135 MMOL/L (ref 136–145)
TROPONIN I SERPL HS-MCNC: 4 NG/L
TROPONIN I SERPL HS-MCNC: 5 NG/L
TROPONIN I SERPL HS-MCNC: 5 NG/L
WBC # BLD AUTO: 4.1 X10(3) UL (ref 4–11)

## 2023-12-22 PROCEDURE — 80048 BASIC METABOLIC PNL TOTAL CA: CPT | Performed by: EMERGENCY MEDICINE

## 2023-12-22 PROCEDURE — 36415 COLL VENOUS BLD VENIPUNCTURE: CPT

## 2023-12-22 PROCEDURE — 84484 ASSAY OF TROPONIN QUANT: CPT | Performed by: EMERGENCY MEDICINE

## 2023-12-22 PROCEDURE — 70450 CT HEAD/BRAIN W/O DYE: CPT | Performed by: EMERGENCY MEDICINE

## 2023-12-22 PROCEDURE — 99285 EMERGENCY DEPT VISIT HI MDM: CPT

## 2023-12-22 PROCEDURE — 99214 OFFICE O/P EST MOD 30 MIN: CPT

## 2023-12-22 PROCEDURE — 93005 ELECTROCARDIOGRAM TRACING: CPT

## 2023-12-22 PROCEDURE — 93010 ELECTROCARDIOGRAM REPORT: CPT | Performed by: INTERNAL MEDICINE

## 2023-12-22 PROCEDURE — 80076 HEPATIC FUNCTION PANEL: CPT | Performed by: EMERGENCY MEDICINE

## 2023-12-22 PROCEDURE — 93010 ELECTROCARDIOGRAM REPORT: CPT

## 2023-12-22 PROCEDURE — 71045 X-RAY EXAM CHEST 1 VIEW: CPT | Performed by: EMERGENCY MEDICINE

## 2023-12-22 PROCEDURE — 85025 COMPLETE CBC W/AUTO DIFF WBC: CPT | Performed by: EMERGENCY MEDICINE

## 2023-12-22 RX ORDER — AMLODIPINE BESYLATE 10 MG/1
10 TABLET ORAL DAILY
Qty: 30 TABLET | Refills: 0 | Status: SHIPPED | OUTPATIENT
Start: 2023-12-22 | End: 2024-01-21

## 2023-12-22 RX ORDER — AMLODIPINE BESYLATE 5 MG/1
10 TABLET ORAL ONCE
Status: COMPLETED | OUTPATIENT
Start: 2023-12-22 | End: 2023-12-22

## 2023-12-22 NOTE — TELEPHONE ENCOUNTER
LAURA - headed to [facility] Immediate Care right now. Almost there. RN called patient. Patient's date of birth and full name both confirmed. RN informed of provider's message as detailed . He verbalizes understanding. And says he is almost at the [facility] Immediate Care. Advised if worsening symptoms call 911. He verbalizes understanding of all information, and agreeable to plan.

## 2023-12-22 NOTE — TELEPHONE ENCOUNTER
Reports chest symptoms with elevated blood pressure. He needs to proceed to either urgent care or the emergency room if his status has not changed. The patient needs a reliable source to recheck his blood pressure. Thank you.

## 2023-12-22 NOTE — TELEPHONE ENCOUNTER
Action Requested: Summary for Provider     []  Critical Lab, Recommendations Needed  [x] Need Additional Advice  []   FYI    []   Need Orders  [] Need Medications Sent to Pharmacy  []  Other     SUMMARY: Per protocol, patient should be seen in office today. No openings. Reason for call: Hypertension  Onset: Data Unavailable    Patient's blood pressure has been elevated. This morning, he was feeling some chest pain and \"not feeling right\" so he checked his blood pressure and it was 174/119 at 02:00. He took his Amlodipine and felt a little better but rechecked it at about 08:00 and it is still 164/114. He had a mild headache earlier but took Ibuprofen and it went away. He denies current chest pain, weakness, blurred vision. He is asking for recommendations. Rn relayed an appointment would be recommended. No OPO appointments. Dr. Valarie Crane, please advise on medication or other recommendations. Rn did relay if he does not hear back and his blood pressure remains elevated and symptoms return, he should go to urgent care.      Reason for Disposition   Systolic BP >= 814 OR Diastolic >= 159    Protocols used: Blood Pressure - High-A-OH

## 2023-12-22 NOTE — ED INITIAL ASSESSMENT (HPI)
Patient with 3 days of elevated blood pressure, 170's /100s. Patient states he is compliant with his blood pressure medications and took it today.   Patient also with concerns for episodes of chest pain that is intermittent and along with dyspnea at rest.

## 2023-12-23 DIAGNOSIS — E11.9 TYPE 2 DIABETES MELLITUS WITHOUT COMPLICATION, WITHOUT LONG-TERM CURRENT USE OF INSULIN (HCC): ICD-10-CM

## 2023-12-23 LAB
ATRIAL RATE: 61 BPM
ATRIAL RATE: 61 BPM
ATRIAL RATE: 67 BPM
P AXIS: 41 DEGREES
P AXIS: 45 DEGREES
P AXIS: 68 DEGREES
P-R INTERVAL: 146 MS
P-R INTERVAL: 150 MS
P-R INTERVAL: 150 MS
Q-T INTERVAL: 398 MS
Q-T INTERVAL: 414 MS
Q-T INTERVAL: 436 MS
QRS DURATION: 78 MS
QRS DURATION: 82 MS
QRS DURATION: 84 MS
QTC CALCULATION (BEZET): 416 MS
QTC CALCULATION (BEZET): 420 MS
QTC CALCULATION (BEZET): 438 MS
R AXIS: -41 DEGREES
R AXIS: -45 DEGREES
R AXIS: -65 DEGREES
T AXIS: 134 DEGREES
T AXIS: 139 DEGREES
T AXIS: 219 DEGREES
VENTRICULAR RATE: 61 BPM
VENTRICULAR RATE: 61 BPM
VENTRICULAR RATE: 67 BPM

## 2023-12-23 RX ORDER — SIMVASTATIN 20 MG
20 TABLET ORAL NIGHTLY
Qty: 90 TABLET | Refills: 1 | OUTPATIENT
Start: 2023-12-23

## 2023-12-23 NOTE — ED INITIAL ASSESSMENT (HPI)
Patient reports high blood pressure/fluctuation in readings @ home. 498H-153Y systolic with high diastolic readings @ home as well. Denies chest pain.

## 2023-12-23 NOTE — DISCHARGE INSTRUCTIONS
Please follow-up with your doctor and the cardiologist soon as possible. Start taking amlodipine at the new dose 10 mg. Return to the ER for changes or worsening or if you change your mind. Read all instructions for further recommendations. Avoid alcohol as your liver enzymes are slightly high. Your primary doctor will need to recheck these in the future.

## 2023-12-26 RX ORDER — LANCETS 33 GAUGE
1 EACH MISCELLANEOUS 2 TIMES DAILY
Qty: 200 EACH | Refills: 0 | Status: SHIPPED | OUTPATIENT
Start: 2023-12-26

## 2024-03-19 ENCOUNTER — OFFICE VISIT (OUTPATIENT)
Dept: FAMILY MEDICINE CLINIC | Facility: CLINIC | Age: 59
End: 2024-03-19

## 2024-03-19 VITALS
OXYGEN SATURATION: 98 % | WEIGHT: 199 LBS | TEMPERATURE: 98 F | SYSTOLIC BLOOD PRESSURE: 110 MMHG | HEART RATE: 95 BPM | DIASTOLIC BLOOD PRESSURE: 90 MMHG | BODY MASS INDEX: 35 KG/M2 | RESPIRATION RATE: 18 BRPM

## 2024-03-19 DIAGNOSIS — R42 VERTIGO: ICD-10-CM

## 2024-03-19 DIAGNOSIS — E11.9 TYPE 2 DIABETES MELLITUS WITHOUT COMPLICATION, WITHOUT LONG-TERM CURRENT USE OF INSULIN (HCC): Primary | ICD-10-CM

## 2024-03-19 DIAGNOSIS — I10 ESSENTIAL HYPERTENSION: ICD-10-CM

## 2024-03-19 DIAGNOSIS — R42 DIZZINESS: ICD-10-CM

## 2024-03-19 LAB — HEMOGLOBIN A1C: 6.3 % (ref 4.3–5.6)

## 2024-03-19 PROCEDURE — 83036 HEMOGLOBIN GLYCOSYLATED A1C: CPT | Performed by: FAMILY MEDICINE

## 2024-03-19 PROCEDURE — 99214 OFFICE O/P EST MOD 30 MIN: CPT | Performed by: FAMILY MEDICINE

## 2024-03-19 PROCEDURE — 3044F HG A1C LEVEL LT 7.0%: CPT | Performed by: FAMILY MEDICINE

## 2024-03-19 PROCEDURE — 3074F SYST BP LT 130 MM HG: CPT | Performed by: FAMILY MEDICINE

## 2024-03-19 PROCEDURE — 3080F DIAST BP >= 90 MM HG: CPT | Performed by: FAMILY MEDICINE

## 2024-03-19 NOTE — PATIENT INSTRUCTIONS
Patient referred to urology.  ENT may be of benefit as well, but we will await the results of the neurological workup.  Patient may also be a good candidate for vestibular therapy.  Compliance with blood pressure medication recommended and encouraged.  A continuation of antianxiety medications also recommended as needed.  A1c update on today.

## 2024-03-19 NOTE — PROGRESS NOTES
Subjective:     Patient ID: Gamaliel Maldonado is a 58 year old male.    58 year old hypertensive AA male with history of anxiety:    Now with fleeting episodes of dizziness which include spinning.  Patient describes even upon awakening a low-grade vertigo and upon opening up his eyes at the very least the perception is that the room is also spinning.  Patient also has challenges for when he first gets up on his feet and with intention and necessity he has to widen his stance because of the dizzy spells.  There is an associated neck and head discomfort.    Patient has been seen in the ER for this complaint as well.  I have reviewed the CT of the head from December 2023 which is a noncontrast study which was unremarkable.    This appears to be connected to the ongoing history for anxiety and similar symptoms with this currently being an exacerbation of those symptoms and seemingly more consistent and worsening.    Not necessarily a confirmed trigger but there is a pattern that this patient should begin to feel the symptoms on Wednesday when he returns to work after a 3 to 4-day period of being off from work.  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        History/Other:   Review of Systems  Current Outpatient Medications   Medication Sig Dispense Refill    Zolpidem Tartrate ER 12.5 MG Oral Tab CR Take 1 tablet (12.5 mg total) by mouth nightly as needed for Sleep. 30 tablet 1    alprazolam 2 MG Oral Tab Take 1 tablet (2 mg total) by mouth nightly as needed. 30 tablet 0    Lancets (ONETOUCH DELICA PLUS [id number]) Does not apply Misc 1 strip by In Vitro route 2 (two) times daily. 200 each 0    simvastatin 20 MG Oral Tab Take 1 tablet (20 mg total) by mouth nightly. 90 tablet 1    Glucose Blood (ONETOUCH VERIO) In Vitro Strip Test 2x daily 200 strip 0    Blood Glucose Monitoring Suppl (ONETOUCH VERIO) w/Device Does not apply Kit 1 each daily. 1 kit 0    amLODIPine 5 MG Oral Tab Take 1 tablet (5 mg total)  by mouth daily. 90 tablet 1    FLUoxetine 10 MG Oral Cap TAKE 1 CAPSULE BY MOUTH DAILY FOR 1 WEEK THEN INCREASE TO 2 CAPSULES BY MOUTH DAILY AT THE BEGINNING OF WEEK TWO. 60 capsule 0    Ergocalciferol (VITAMIN D CAPS 26356 IU OR) Take by mouth.      Eszopiclone 2 MG Oral Tab Take 1 tablet (2 mg total) by mouth nightly. 30 tablet 0     Allergies:No Known Allergies    Past Medical History:   Diagnosis Date    Diabetes (HCC)     Essential hypertension     High blood pressure       Past Surgical History:   Procedure Laterality Date    COLONOSCOPY N/A 3/20/2017    Procedure: COLONOSCOPY;  Surgeon: John Jernigan MD;  Location: [facility] ENDOSCOPY    COLONOSCOPY N/A 10/26/2021    Procedure: COLONOSCOPY;  Surgeon: John Jernigan MD;  Location: [facility] ENDOSCOPY    ELBOW SURGERY Right 2010    right elbow ligament torn     FOOT SURGERY Left 1999    archilles tendon torn    HERNIA SURGERY        Family History   Problem Relation Age of Onset    Diabetes Mother       Social History:   Social History     Socioeconomic History    Marital status: Single   Tobacco Use    Smoking status: Every Day     Packs/day: 0.00     Years: 20.00     Additional pack years: 0.00     Total pack years: 0.00     Types: Cigars, Cigarettes    Smokeless tobacco: Never    Tobacco comments:     2-3 cigars daily   Vaping Use    Vaping Use: Never used   Substance and Sexual Activity    Alcohol use: Yes     Alcohol/week: 0.0 standard drinks of alcohol     Comment: 2 beers a day and possibly scotch    Drug use: No        Objective:   Vitals:    03/19/24 1222   BP: 110/90   Pulse:    Resp:    Temp:        Physical Exam  Constitutional:       Appearance: Normal appearance.   HENT:      Head: Normocephalic and atraumatic.   Cardiovascular:      Rate and Rhythm: Normal rate and regular rhythm.      Heart sounds:      No gallop.   Pulmonary:      Breath sounds: Normal breath sounds.   Neurological:      Mental Status: He is alert and oriented to  person, place, and time.      Deep Tendon Reflexes: Reflexes normal.   Psychiatric:      Comments: Quietly anxious.         Assessment & Plan:   1. Dizziness  Referral.  - Neuro Referral - In Network    2. Vertigo  Referral.  - Neuro Referral - In Network    3. Type 2 diabetes mellitus without complication, without long-term current use of insulin ([facility])  Status update.  - Hemoglobin A1C; Standing  - Hemoglobin A1C    4. Essential hypertension  To goal when taken manually.    No orders of the defined types were placed in this encounter.      Meds This Visit:  Requested Prescriptions      No prescriptions requested or ordered in this encounter       Imaging & Referrals:  None     Patient Instructions   Patient referred to urology.  ENT may be of benefit as well, but we will await the results of the neurological workup.  Patient may also be a good candidate for vestibular therapy.  Compliance with blood pressure medication recommended and encouraged.  A continuation of antianxiety medications also recommended as needed.  A1c update on today.    Return in about 6 weeks (around 4/30/2024), or if symptoms worsen or fail to improve.

## 2024-04-23 DIAGNOSIS — F41.1 GAD (GENERALIZED ANXIETY DISORDER): ICD-10-CM

## 2024-04-24 RX ORDER — ALPRAZOLAM 2 MG/1
2 TABLET ORAL NIGHTLY PRN
Qty: 30 TABLET | Refills: 0 | Status: SHIPPED | OUTPATIENT
Start: 2024-04-24

## 2024-04-24 NOTE — TELEPHONE ENCOUNTER
Please Review. Protocol Failed; No Protocol     Recent fills: 11/16/2023, 02/07/2024   pt is due   Last Rx written: 02/07/2024  LOV: 03/19/2024    Future Appointments  Date Type Provider Dept   04/30/24 Appointment Medhat Rutledge DO [facility]   Showing future appointments within next 150 days with a meds authorizing provider and meeting all other requirements        Requested Prescriptions   Pending Prescriptions Disp Refills    alprazolam 2 MG Oral Tab 30 tablet 0     Sig: Take 1 tablet (2 mg total) by mouth nightly as needed.       Controlled Substance Medication Failed - 4/23/2024  4:37 PM        Failed - This medication is a controlled substance - forward to provider to refill               Future Appointments         Provider Department Appt Notes    In 6 days Medhat Rutledge DO [facility] 6 Wk Follow Up per dr KUMAR    In 1 month Murtaza Rosas MD [facility] rfd by dr rutledge/ alonzo/ ins bcbs ppo          Recent Outpatient Visits              1 month ago Type 2 diabetes mellitus without complication, without long-term current use of insulin ([facility])    [facility] Medhat Rutledge DO    Office Visit    6 months ago Type 2 diabetes mellitus without complication, without long-term current use of insulin ([facility])    [facility] Medhat Rutledge DO    Office Visit    10 months ago Uncontrolled type 2 diabetes mellitus with hyperglycemia ([facility])    [facility] Angela Sorenson APRN    Office Visit    11 months ago Lump in upper outer quadrant of left breast    [facility], [place], Minhxuyen, PA-C    Office Visit    1 year ago Routine physical examination    [facility] Medhat Rutledge,     Office  Visit

## 2024-04-27 DIAGNOSIS — F51.04 PSYCHOPHYSIOLOGICAL INSOMNIA: ICD-10-CM

## 2024-04-29 RX ORDER — ZOLPIDEM TARTRATE 12.5 MG/1
12.5 TABLET, FILM COATED, EXTENDED RELEASE ORAL NIGHTLY PRN
Qty: 30 TABLET | Refills: 1 | Status: SHIPPED | OUTPATIENT
Start: 2024-04-29

## 2024-04-29 NOTE — TELEPHONE ENCOUNTER
Please review. Rx failed/no protocol.    Recent fills: 03/27/2024, 02/07/2024, and 11/16/2024  Last prescription written on: 02/07/2024  Last office visit: 03/19/2024  Requested Prescriptions   Pending Prescriptions Disp Refills    Zolpidem Tartrate ER 12.5 MG Oral Tab CR 30 tablet 1     Sig: Take 1 tablet (12.5 mg total) by mouth nightly as needed for Sleep.       Controlled Substance Medication Failed - 4/27/2024  8:54 AM        Failed - This medication is a controlled substance - forward to provider to refill             Future Appointments         Provider Department Appt Notes    Tomorrow Medhat Rutledge DO [facility] 6 Wk Follow Up per dr KUMAR    In 1 month Murtaza Rosas MD [facility] rfd by dr rutledge/ alonzo/ ins bcbs ppo          Recent Outpatient Visits              1 month ago Type 2 diabetes mellitus without complication, without long-term current use of insulin ([facility])    [facility] Medhat Rutledge,     Office Visit    6 months ago Type 2 diabetes mellitus without complication, without long-term current use of insulin ([facility])    [facility] Medhat Rutledge,     Office Visit    10 months ago Uncontrolled type 2 diabetes mellitus with hyperglycemia ([facility])    [facility] Angela Sorenson, LISET    Office Visit    11 months ago Lump in upper outer quadrant of left breast    [facility], [place] Nguyen, Minhxuyen, PA-C    Office Visit    1 year ago Routine physical examination    [facility] Medhat Rutledge, DO    Office Visit

## 2024-04-30 ENCOUNTER — LAB ENCOUNTER (OUTPATIENT)
Dept: LAB | Age: 59
End: 2024-04-30
Attending: FAMILY MEDICINE
Payer: COMMERCIAL

## 2024-04-30 ENCOUNTER — OFFICE VISIT (OUTPATIENT)
Dept: FAMILY MEDICINE CLINIC | Facility: CLINIC | Age: 59
End: 2024-04-30

## 2024-04-30 VITALS
RESPIRATION RATE: 18 BRPM | HEART RATE: 81 BPM | DIASTOLIC BLOOD PRESSURE: 90 MMHG | TEMPERATURE: 98 F | WEIGHT: 203 LBS | SYSTOLIC BLOOD PRESSURE: 120 MMHG | BODY MASS INDEX: 36 KG/M2 | OXYGEN SATURATION: 97 %

## 2024-04-30 DIAGNOSIS — Z12.11 COLON CANCER SCREENING: Primary | ICD-10-CM

## 2024-04-30 DIAGNOSIS — G89.29 CHRONIC PAIN OF BOTH SHOULDERS: ICD-10-CM

## 2024-04-30 DIAGNOSIS — Z87.39 PERSONAL HISTORY OF GOUT: ICD-10-CM

## 2024-04-30 DIAGNOSIS — M25.511 CHRONIC PAIN OF BOTH SHOULDERS: ICD-10-CM

## 2024-04-30 DIAGNOSIS — F41.1 GAD (GENERALIZED ANXIETY DISORDER): ICD-10-CM

## 2024-04-30 DIAGNOSIS — M25.512 CHRONIC PAIN OF BOTH SHOULDERS: ICD-10-CM

## 2024-04-30 DIAGNOSIS — F51.04 PSYCHOPHYSIOLOGICAL INSOMNIA: ICD-10-CM

## 2024-04-30 LAB
ERYTHROCYTE [SEDIMENTATION RATE] IN BLOOD: 20 MM/HR
RHEUMATOID FACT SERPL-ACNC: <10 IU/ML (ref ?–14)
URATE SERPL-MCNC: 7.4 MG/DL

## 2024-04-30 PROCEDURE — 86225 DNA ANTIBODY NATIVE: CPT

## 2024-04-30 PROCEDURE — 86431 RHEUMATOID FACTOR QUANT: CPT

## 2024-04-30 PROCEDURE — 84550 ASSAY OF BLOOD/URIC ACID: CPT

## 2024-04-30 PROCEDURE — 36415 COLL VENOUS BLD VENIPUNCTURE: CPT

## 2024-04-30 PROCEDURE — 85652 RBC SED RATE AUTOMATED: CPT

## 2024-04-30 PROCEDURE — 86038 ANTINUCLEAR ANTIBODIES: CPT

## 2024-04-30 RX ORDER — ZALEPLON 10 MG/1
10 CAPSULE ORAL NIGHTLY
Qty: 30 CAPSULE | Refills: 0 | Status: SHIPPED | OUTPATIENT
Start: 2024-04-30

## 2024-04-30 NOTE — PATIENT INSTRUCTIONS
Systemic inflammatory workup initiated. Recommend psychiatry reassessment.  Medication reviewed and renewed where needed and appropriate.  Comply with medications.  Monitor blood pressures and record at home. Limit salt intake.  FMLA to be continued as when first allotted.  Pending x-ray results, patient may need to be seen by orthopedics.  Sonata has been prescribed.  Patient to hold on zolpidem for now.

## 2024-05-01 LAB
DSDNA IGG SERPL IA-ACNC: 1.6 IU/ML
ENA AB SER QL IA: 0.3 UG/L
ENA AB SER QL IA: NEGATIVE

## 2024-05-07 ENCOUNTER — HOSPITAL ENCOUNTER (OUTPATIENT)
Dept: GENERAL RADIOLOGY | Age: 59
Discharge: HOME | End: 2024-05-07
Attending: FAMILY MEDICINE
Payer: COMMERCIAL

## 2024-05-07 DIAGNOSIS — G89.29 CHRONIC PAIN OF BOTH SHOULDERS: ICD-10-CM

## 2024-05-07 DIAGNOSIS — M25.512 CHRONIC PAIN OF BOTH SHOULDERS: ICD-10-CM

## 2024-05-07 DIAGNOSIS — M25.511 CHRONIC PAIN OF BOTH SHOULDERS: ICD-10-CM

## 2024-05-07 DIAGNOSIS — F51.04 PSYCHOPHYSIOLOGICAL INSOMNIA: ICD-10-CM

## 2024-05-07 PROCEDURE — 73030 X-RAY EXAM OF SHOULDER: CPT | Performed by: FAMILY MEDICINE

## 2024-05-07 NOTE — PROGRESS NOTES
Subjective:     Patient ID: Gamaliel Maldonado is a 58 year old male.    This patient is a 58-year-old -American gentleman who continues to suffer from anxiety symptoms which are incapacitating.  Patient remains on the night shift which may be contributing to his symptoms, however when he does not have to go to work he does not experience his symptoms.    His sleep medication prescription no longer aids him in falling asleep or staying asleep.  Patient is asking is there any alternative.  Now his anxiety is all compounded by chronic insomnia and this worsens his whole general wellbeing.    The following information has been communicated to the orthopedic specialist who will be making an assessment regarding this patient's bilateral shoulder pain:    58 year old AA male with unprovoked, non trauma induced bilateral shoulder pain. Please evaluate and treat as deemed necessary and appropriate.    The patient does have a history for gout and we will recheck his uric acid level on today.            History/Other:   Review of Systems  Current Outpatient Medications   Medication Sig Dispense Refill    Zaleplon 10 MG Oral Cap Take 1 capsule (10 mg total) by mouth at bedtime. 30 capsule 0    Zolpidem Tartrate ER 12.5 MG Oral Tab CR Take 1 tablet (12.5 mg total) by mouth nightly as needed for Sleep. 30 tablet 1    alprazolam 2 MG Oral Tab Take 1 tablet (2 mg total) by mouth nightly as needed. 30 tablet 0    Lancets (ONETOUCH DELICA PLUS [id number]) Does not apply Misc 1 strip by In Vitro route 2 (two) times daily. 200 each 0    simvastatin 20 MG Oral Tab Take 1 tablet (20 mg total) by mouth nightly. 90 tablet 1    Glucose Blood (ONETOUCH VERIO) In Vitro Strip Test 2x daily 200 strip 0    Blood Glucose Monitoring Suppl (ONETOUCH VERIO) w/Device Does not apply Kit 1 each daily. 1 kit 0    amLODIPine 5 MG Oral Tab Take 1 tablet (5 mg total) by mouth daily. 90 tablet 1    FLUoxetine 10 MG Oral Cap TAKE 1 CAPSULE BY MOUTH DAILY  FOR 1 WEEK THEN INCREASE TO 2 CAPSULES BY MOUTH DAILY AT THE BEGINNING OF WEEK TWO. 60 capsule 0    Ergocalciferol (VITAMIN D CAPS 30652 IU OR) Take by mouth.      Eszopiclone 2 MG Oral Tab Take 1 tablet (2 mg total) by mouth nightly. 30 tablet 0     Allergies:No Known Allergies    Past Medical History:    Diabetes (HCC)    Essential hypertension    High blood pressure      Past Surgical History:   Procedure Laterality Date    Colonoscopy N/A 3/20/2017    Procedure: COLONOSCOPY;  Surgeon: John Jernigan MD;  Location: [facility] ENDOSCOPY    Colonoscopy N/A 10/26/2021    Procedure: COLONOSCOPY;  Surgeon: John Jernigan MD;  Location: [facility] ENDOSCOPY    Elbow surgery Right 2010    right elbow ligament torn     Foot surgery Left 1999    archilles tendon torn    Hernia surgery        Family History   Problem Relation Age of Onset    Diabetes Mother       Social History:   Social History     Socioeconomic History    Marital status: Single   Tobacco Use    Smoking status: Every Day     Current packs/day: 0.00     Types: Cigars, Cigarettes    Smokeless tobacco: Never    Tobacco comments:     2-3 cigars daily   Vaping Use    Vaping status: Never Used   Substance and Sexual Activity    Alcohol use: Yes     Alcohol/week: 0.0 standard drinks of alcohol     Comment: 2 beers a day and possibly scotch    Drug use: No        Objective:   Vitals:    04/30/24 1605   BP: 120/90   Pulse:    Resp:    Temp:        Physical Exam  Constitutional:       General: He is in acute distress.      Appearance: He is not ill-appearing.   HENT:      Head: Normocephalic and atraumatic.   Cardiovascular:      Rate and Rhythm: Normal rate and regular rhythm.      Heart sounds:      No gallop.   Pulmonary:      Effort: Pulmonary effort is normal.      Breath sounds: Normal breath sounds.   Musculoskeletal:      Right shoulder: Tenderness and crepitus present.      Left shoulder: Tenderness and crepitus present.        Arms:        Comments: Regions of discomfort as depicted.   Neurological:      Mental Status: He is alert and oriented to person, place, and time.   Psychiatric:         Mood and Affect: Mood is anxious.         Assessment & Plan:   1. Chronic pain of both shoulders  The following x-rays have been ordered.  Systemic inflammatory disorder workup initiated.  Patient referred to orthopedics for further assessment of bilateral shoulder  - XR SHOULDER, COMPLETE (MIN 2 VIEWS), LEFT (CPT=73030); Future  - XR SHOULDER, COMPLETE (MIN 2 VIEWS), RIGHT (CPT=73030); Future  - Connective Tissue Disease (CATHERINE) Screen [E]; Future  - Rheumatoid Arthritis Factor [E]; Future  - Sed Rate, Westergren (Automated) [E]; Future  - Ortho Referral - In Network    2. Colon cancer screening  Referred.  - Gastro Referral - [place] ([place])    3. LONDON (generalized anxiety disorder)  Status unchanged.    4. Psychophysiological insomnia  Patient prescription switch from zolpidem to Sonata.  - Zaleplon 10 MG Oral Cap; Take 1 capsule (10 mg total) by mouth at bedtime.  Dispense: 30 capsule; Refill: 0    5. Personal history of gout  Uric acid level to be determined.  - Uric Acid; Future      Orders Placed This Encounter   Procedures    Uric Acid    Connective Tissue Disease (CATHERINE) Screen [E]    Rheumatoid Arthritis Factor [E]    Sed Rate, Westergren (Automated) [E]       Meds This Visit:  Requested Prescriptions     Signed Prescriptions Disp Refills    Zaleplon 10 MG Oral Cap 30 capsule 0     Sig: Take 1 capsule (10 mg total) by mouth at bedtime.       Imaging & Referrals:  GASTRO - INTERNAL  ORTHOPEDIC - INTERNAL  XR SHOULDER, COMPLETE (MIN 2 VIEWS), LEFT (CPT=73030)  XR SHOULDER, COMPLETE (MIN 2 VIEWS), RIGHT (CPT=73030)     Patient Instructions   Systemic inflammatory workup initiated. Recommend psychiatry reassessment.  Medication reviewed and renewed where needed and appropriate.  Comply with medications.  Monitor blood pressures and record at home. Limit  salt intake.  FMLA to be continued as when first allotted.  Pending x-ray results, patient may need to be seen by orthopedics.  Sonata has been prescribed.  Patient to hold on zolpidem for now.    Return if symptoms worsen or fail to improve.

## 2024-05-08 DIAGNOSIS — E11.9 TYPE 2 DIABETES MELLITUS WITHOUT COMPLICATION, WITHOUT LONG-TERM CURRENT USE OF INSULIN (HCC): ICD-10-CM

## 2024-05-08 DIAGNOSIS — I10 ESSENTIAL HYPERTENSION: ICD-10-CM

## 2024-05-09 RX ORDER — ZALEPLON 10 MG/1
10 CAPSULE ORAL NIGHTLY
Qty: 30 CAPSULE | Refills: 0 | OUTPATIENT
Start: 2024-05-09

## 2024-05-09 NOTE — TELEPHONE ENCOUNTER
Please review. Protocol Failed; No Protocol    Requested Prescriptions   Pending Prescriptions Disp Refills    AMLODIPINE 10 MG Oral Tab [Pharmacy Med Name: AMLODIPINE BESYLATE 10MG TABLETS] 90 tablet 1     Sig: TAKE 1 TABLET(10 MG) BY MOUTH DAILY       Hypertension Medications Protocol Failed - 5/8/2024  8:08 AM        Failed - Last BP reading less than 140/90     BP Readings from Last 1 Encounters:   04/30/24 120/90               Passed - CMP or BMP in past 12 months        Passed - In person appointment or virtual visit in the past 12 mos or appointment in next 3 mos     Recent Outpatient Visits              1 week ago Colon cancer screening    [facility] Medhat Rutledge, DO    Office Visit    1 month ago Type 2 diabetes mellitus without complication, without long-term current use of insulin ([facility])    [facility] Medhat Rutledge, DO    Office Visit    6 months ago Type 2 diabetes mellitus without complication, without long-term current use of insulin ([facility])    [facility] Medhat Rutledge, DO    Office Visit    11 months ago Uncontrolled type 2 diabetes mellitus with hyperglycemia ([facility])    [facility] Angela Sorenson APRN    Office Visit    11 months ago Lump in upper outer quadrant of left breast    [facility] [place], Minhxuyen, PA-C    Office Visit          Future Appointments         Provider Department Appt Notes    In 1 month Murtaza Rosas MD [facility] rfd by dr rutledge/ alonzo/ nathalie bcbs ppo                    Passed - EGFRCR or GFRAA > 50     GFR Evaluation  EGFRCR: 81 , resulted on 12/22/2023            SIMVASTATIN 20 MG Oral Tab [Pharmacy Med Name: SIMVASTATIN 20MG TABLETS] 90 tablet 1     Sig: TAKE 1 TABLET(20 MG) BY MOUTH EVERY NIGHT        Cholesterol Medication Protocol Failed - 5/8/2024  8:08 AM        Failed - Lipid panel within past 12 months     Lab Results   Component Value Date    CHOLEST 232 (H) 02/20/2023    TRIG 117 02/20/2023    HDL 63 02/20/2023     (H) 02/20/2023    TCHDLRATIO 3.7 02/20/2023    NONHDLC 169 (H) 02/20/2023             Passed - ALT < 80     Lab Results   Component Value Date    ALT 60 (H) 12/22/2023             Passed - ALT resulted within past year        Passed - In person appointment or virtual visit in the past 12 mos or appointment in next 3 mos     Recent Outpatient Visits              1 week ago Colon cancer screening    [facility] Medhat Rutledge, DO    Office Visit    1 month ago Type 2 diabetes mellitus without complication, without long-term current use of insulin ([facility])    [facility] Medhat Rutledge, DO    Office Visit    6 months ago Type 2 diabetes mellitus without complication, without long-term current use of insulin ([facility])    [facility] Medhat Rutledge, DO    Office Visit    11 months ago Uncontrolled type 2 diabetes mellitus with hyperglycemia (HCC)    [facility] Angela Sorenson APRN    Office Visit    11 months ago Lump in upper outer quadrant of left breast    [facility]          Future Appointments         Provider Department Appt Notes    [facility] alonzo/ nathalie martins ppo                           Future Appointments         Provider Department Appt Notes    In 1 month [facility]/ alonzo/ nathalie martins ppo          Recent Outpatient Visits              1  week ago Colon cancer screening    [facility] Medhat Fontana, DO    Office Visit    1 month ago Type 2 diabetes mellitus without complication, without long-term current use of insulin ([facility])    [facility] Medhat Fontana, DO    Office Visit    6 months ago Type 2 diabetes mellitus without complication, without long-term current use of insulin ([facility])    [facility] Medhat Fontana, DO    Office Visit    11 months ago Uncontrolled type 2 diabetes mellitus with hyperglycemia ([facility])    [facility] Angela Sorenson APRN    Office Visit    11 months ago Lump in upper outer quadrant of left breast    [facility] [place], Minhxuyen, PA-C    Office Visit

## 2024-05-10 RX ORDER — AMLODIPINE BESYLATE 10 MG/1
10 TABLET ORAL DAILY
Qty: 90 TABLET | Refills: 3 | Status: SHIPPED | OUTPATIENT
Start: 2024-05-10

## 2024-05-10 RX ORDER — SIMVASTATIN 20 MG
20 TABLET ORAL NIGHTLY
Qty: 90 TABLET | Refills: 3 | Status: SHIPPED | OUTPATIENT
Start: 2024-05-10

## 2024-05-24 DIAGNOSIS — F51.04 PSYCHOPHYSIOLOGICAL INSOMNIA: ICD-10-CM

## 2024-05-28 RX ORDER — ZALEPLON 10 MG/1
10 CAPSULE ORAL NIGHTLY
Qty: 30 CAPSULE | Refills: 0 | Status: SHIPPED | OUTPATIENT
Start: 2024-05-28

## 2024-05-28 NOTE — TELEPHONE ENCOUNTER
Please review. Protocol Failed; No Protocol    Recent fills: 4/30/2024  Last Rx written: 4/30/2024  Last office visit: 4/30/2024        Requested Prescriptions   Pending Prescriptions Disp Refills    Zaleplon 10 MG Oral Cap 30 capsule 0     Sig: Take 1 capsule (10 mg total) by mouth at bedtime.       Controlled Substance Medication Failed - 5/24/2024  1:04 AM        Failed - This medication is a controlled substance - forward to provider to refill               Future Appointments         Provider Department Appt Notes    In 3 weeks Murtaza Rosas MD [facility] rfd by dr rutledge/ alonzo/ nathalie bcbs ppo          Recent Outpatient Visits              4 weeks ago Colon cancer screening    [facility] Medhat Rutledge, DO    Office Visit    2 months ago Type 2 diabetes mellitus without complication, without long-term current use of insulin ([facility])    [facility] Medhat Rutledge, DO    Office Visit    7 months ago Type 2 diabetes mellitus without complication, without long-term current use of insulin ([facility])    [facility] Medhat Rutledge, DO    Office Visit    11 months ago Uncontrolled type 2 diabetes mellitus with hyperglycemia ([facility])    [facility] Angela Sorenson APRN    Office Visit    12 months ago Lump in upper outer quadrant of left breast    [facility], [place], Minhxuyen, PA-C    Office Visit

## 2024-07-03 ENCOUNTER — NURSE TRIAGE (OUTPATIENT)
Dept: FAMILY MEDICINE CLINIC | Facility: CLINIC | Age: 59
End: 2024-07-03

## 2024-07-03 DIAGNOSIS — F51.04 PSYCHOPHYSIOLOGICAL INSOMNIA: ICD-10-CM

## 2024-07-03 DIAGNOSIS — F41.1 GAD (GENERALIZED ANXIETY DISORDER): ICD-10-CM

## 2024-07-03 NOTE — TELEPHONE ENCOUNTER
Needs appointment to update FMLA.   PCP has no available appointment.     Appointment made with Dr Jacobs.  Hx of anxiety. Has no worsening symptoms.

## 2024-07-05 RX ORDER — ALPRAZOLAM 2 MG/1
2 TABLET ORAL NIGHTLY PRN
Qty: 30 TABLET | Refills: 0 | Status: SHIPPED | OUTPATIENT
Start: 2024-07-05

## 2024-07-05 RX ORDER — ZOLPIDEM TARTRATE 12.5 MG/1
12.5 TABLET, FILM COATED, EXTENDED RELEASE ORAL NIGHTLY PRN
Qty: 30 TABLET | Refills: 1 | Status: SHIPPED | OUTPATIENT
Start: 2024-07-05

## 2024-07-05 NOTE — TELEPHONE ENCOUNTER
Please review. Protocol Failed; No Protocol      Recent fills: 2/7/2024, 3/27/2024, 4/29/2024  Last Rx written: 4/29/2024  Last office visit: 4/30/2024        Future Appointments  Date Type Provider Dept   07/09/24 Appointment Franco Jacobs MD [facility]   Showing future appointments within next 150 days with a meds authorizing provider and meeting all other requirements        Requested Prescriptions   Pending Prescriptions Disp Refills    Zolpidem Tartrate ER 12.5 MG Oral Tab CR 30 tablet 1     Sig: Take 1 tablet (12.5 mg total) by mouth nightly as needed for Sleep.       Controlled Substance Medication Failed - 7/3/2024  9:12 AM        Failed - This medication is a controlled substance - forward to provider to refill               Future Appointments         Provider Department Appt Notes    In 4 days Franco Jacobs MD [facility], [facility] discuss anxiety          Recent Outpatient Visits              2 months ago Colon cancer screening    [facility] Medhat Fontana,     Office Visit    3 months ago Type 2 diabetes mellitus without complication, without long-term current use of insulin ([facility])    [facility] Medhat Fontana,     Office Visit    8 months ago Type 2 diabetes mellitus without complication, without long-term current use of insulin ([facility])    [facility] Medhat Fontana,     Office Visit    1 year ago Uncontrolled type 2 diabetes mellitus with hyperglycemia ([facility])    [facility] Angela Sorenson APRN    Office Visit    1 year ago Lump in upper outer quadrant of left breast    [facility] [place], Minhxuyen, PA-C    Office Visit

## 2024-07-05 NOTE — TELEPHONE ENCOUNTER
Please review. Protocol Failed; No Protocol      Recent fills: 2/7/2024, 4/24/2024  Last Rx written: 4/24/2024  Last office visit: 4/30/2024    Future Appointments  Date Type Provider Dept   07/09/24 Appointment Franco Jacobs MD [facility]   Showing future appointments within next 150 days with a meds authorizing provider and meeting all other requirements        Requested Prescriptions   Pending Prescriptions Disp Refills    alprazolam 2 MG Oral Tab 30 tablet 0     Sig: Take 1 tablet (2 mg total) by mouth nightly as needed.       Controlled Substance Medication Failed - 7/3/2024  9:13 AM        Failed - This medication is a controlled substance - forward to provider to refill               Future Appointments         Provider Department Appt Notes    In 4 days Franco Jacobs MD [facility], [facility] discuss anxiety          Recent Outpatient Visits              2 months ago Colon cancer screening    [facility] Medhat Fontana, DO    Office Visit    3 months ago Type 2 diabetes mellitus without complication, without long-term current use of insulin ([facility])    [facility] Medhat Fontana,     Office Visit    8 months ago Type 2 diabetes mellitus without complication, without long-term current use of insulin ([facility])    [facility] Medhat Fontana,     Office Visit    1 year ago Uncontrolled type 2 diabetes mellitus with hyperglycemia (HCC)    [facility] Angela Sorenson APRN    Office Visit    1 year ago Lump in upper outer quadrant of left breast    [facility], [place], Minhxuyen, PA-C    Office Visit

## 2024-07-08 NOTE — TELEPHONE ENCOUNTER
I do not need to see the patient again for this.  It has been consistent issue and there has been some specialty care involved.  The forms just need to go to the forms department and if they want me to addend the note, then I will.  Patient just needs to let us know if his intention is a continuous period of time or if this is for intermittent time off per month.  Thank you.

## 2024-07-08 NOTE — TELEPHONE ENCOUNTER
Called patient, no answer.     Left message that appointment with Dr. Jacobs tomorrow is unnecessary and will be canceled.  Advised patient that we could use the note from May visit and that he should forward us the forms he needs to be completed.

## 2024-07-09 ENCOUNTER — TELEPHONE (OUTPATIENT)
Dept: FAMILY MEDICINE CLINIC | Facility: CLINIC | Age: 59
End: 2024-07-09

## 2024-07-09 NOTE — TELEPHONE ENCOUNTER
Patient dropped off paperwork with Dr. Fontana to be filled out..  [facility] paperwork to be completed.  This is a recertification, this was also emailed to the forms department for completion.      Please call patient with any questions.

## 2024-07-11 NOTE — TELEPHONE ENCOUNTER
[facility] FMLA forms from [employer]. Logged for processing. Valid [facility] auth on file.

## 2024-07-12 ENCOUNTER — TELEPHONE (OUTPATIENT)
Dept: FAMILY MEDICINE CLINIC | Facility: CLINIC | Age: 59
End: 2024-07-12

## 2024-07-12 DIAGNOSIS — R73.03 PRE-DIABETES: Primary | ICD-10-CM

## 2024-07-15 DIAGNOSIS — I10 ESSENTIAL HYPERTENSION: ICD-10-CM

## 2024-07-18 RX ORDER — AMLODIPINE BESYLATE 10 MG/1
10 TABLET ORAL DAILY
Qty: 90 TABLET | Refills: 3 | OUTPATIENT
Start: 2024-07-18

## 2024-07-23 NOTE — TELEPHONE ENCOUNTER
Dr. Fontana,      *The ACKNOWLEDGE button has been moved to the top right ribbon*    Please sign off on form if you agree to: Family Medical Leave Act recert due to hypertension  (place your signature on the first page only)    -From your Inbasket, Highlight the patient and click Chart   -Double click the 7/9/24 Forms Completion telephone encounter  -Scroll down to the Media section   -Click the blue Hyperlink: Family Medical Leave Act Dr. Fontana 7/23/24  -Click Acknowledge located in the top right ribbon/menu   -Drag the mouse into the blank space of the document and a + sign will appear. Left click to   electronically sign the document.     Thank you,  Laurita

## 2024-07-24 NOTE — TELEPHONE ENCOUNTER
Forms completed and faxed to [employer] fax: [phone]. E fax confirmation received. [employer] message sent to patient.

## 2024-08-05 ENCOUNTER — OFFICE VISIT (OUTPATIENT)
Dept: INTERNAL MEDICINE CLINIC | Facility: CLINIC | Age: 59
End: 2024-08-05

## 2024-08-05 ENCOUNTER — LAB ENCOUNTER (OUTPATIENT)
Dept: LAB | Age: 59
End: 2024-08-05
Payer: COMMERCIAL

## 2024-08-05 ENCOUNTER — NURSE TRIAGE (OUTPATIENT)
Dept: FAMILY MEDICINE CLINIC | Facility: CLINIC | Age: 59
End: 2024-08-05

## 2024-08-05 VITALS
DIASTOLIC BLOOD PRESSURE: 84 MMHG | OXYGEN SATURATION: 97 % | HEART RATE: 59 BPM | WEIGHT: 195.38 LBS | HEIGHT: 71 IN | SYSTOLIC BLOOD PRESSURE: 158 MMHG | BODY MASS INDEX: 27.35 KG/M2

## 2024-08-05 DIAGNOSIS — I10 PRIMARY HYPERTENSION: ICD-10-CM

## 2024-08-05 DIAGNOSIS — K58.2 IRRITABLE BOWEL SYNDROME WITH BOTH CONSTIPATION AND DIARRHEA: ICD-10-CM

## 2024-08-05 DIAGNOSIS — K92.1 BLACK STOOLS: Primary | ICD-10-CM

## 2024-08-05 DIAGNOSIS — R10.30 LOWER ABDOMINAL PAIN: ICD-10-CM

## 2024-08-05 DIAGNOSIS — K92.1 BLACK STOOLS: ICD-10-CM

## 2024-08-05 DIAGNOSIS — Z87.898 HISTORY OF HEAVY ALCOHOL CONSUMPTION: ICD-10-CM

## 2024-08-05 LAB
ALBUMIN SERPL-MCNC: 4.5 G/DL (ref 3.2–4.8)
ALBUMIN/GLOB SERPL: 1.5 {RATIO} (ref 1–2)
ALP LIVER SERPL-CCNC: 51 U/L
ALT SERPL-CCNC: 55 U/L
ANION GAP SERPL CALC-SCNC: 7 MMOL/L (ref 0–18)
AST SERPL-CCNC: 46 U/L (ref ?–34)
BILIRUB SERPL-MCNC: 1 MG/DL (ref 0.3–1.2)
BUN BLD-MCNC: 8 MG/DL (ref 9–23)
BUN/CREAT SERPL: 6.6 (ref 10–20)
CALCIUM BLD-MCNC: 9.4 MG/DL (ref 8.7–10.4)
CHLORIDE SERPL-SCNC: 107 MMOL/L (ref 98–112)
CO2 SERPL-SCNC: 27 MMOL/L (ref 21–32)
CREAT BLD-MCNC: 1.21 MG/DL
DEPRECATED RDW RBC AUTO: 42.5 FL (ref 35.1–46.3)
EGFRCR SERPLBLD CKD-EPI 2021: 69 ML/MIN/1.73M2 (ref 60–?)
ERYTHROCYTE [DISTWIDTH] IN BLOOD BY AUTOMATED COUNT: 13 % (ref 11–15)
FASTING STATUS PATIENT QL REPORTED: NO
GLOBULIN PLAS-MCNC: 3.1 G/DL (ref 2–3.5)
GLUCOSE BLD-MCNC: 97 MG/DL (ref 70–99)
HCT VFR BLD AUTO: 47.8 %
HGB BLD-MCNC: 16.1 G/DL
MCH RBC QN AUTO: 30.1 PG (ref 26–34)
MCHC RBC AUTO-ENTMCNC: 33.7 G/DL (ref 31–37)
MCV RBC AUTO: 89.3 FL
OSMOLALITY SERPL CALC.SUM OF ELEC: 290 MOSM/KG (ref 275–295)
PLATELET # BLD AUTO: 125 10(3)UL (ref 150–450)
PLATELETS.RETICULATED NFR BLD AUTO: 6 % (ref 0–7)
POTASSIUM SERPL-SCNC: 3.8 MMOL/L (ref 3.5–5.1)
PROT SERPL-MCNC: 7.6 G/DL (ref 5.7–8.2)
RBC # BLD AUTO: 5.35 X10(6)UL
SODIUM SERPL-SCNC: 141 MMOL/L (ref 136–145)
WBC # BLD AUTO: 5 X10(3) UL (ref 4–11)

## 2024-08-05 PROCEDURE — 85027 COMPLETE CBC AUTOMATED: CPT

## 2024-08-05 PROCEDURE — 36415 COLL VENOUS BLD VENIPUNCTURE: CPT

## 2024-08-05 PROCEDURE — 80053 COMPREHEN METABOLIC PANEL: CPT

## 2024-08-05 NOTE — TELEPHONE ENCOUNTER
Patient notified of provider's recommendation.  Patient verbalized understanding.  Patient will keep appointment as scheduled.

## 2024-08-05 NOTE — TELEPHONE ENCOUNTER
I will await and review the visit that he has been scheduled for before I reach out to the patient.  You can let him know that I am aware.  Thank you.

## 2024-08-05 NOTE — TELEPHONE ENCOUNTER
Action Requested: Summary for Provider     []  Critical Lab, Recommendations Needed  [] Need Additional Advice  []   FYI    []   Need Orders  [] Need Medications Sent to Pharmacy  [x]  Other     SUMMARY: scheduled for Visit today with Елена Fontana --- despite appointment made, patient would like a phone call from you at some point today  [phone] (Mobile)      Reason for call: Abdominal Pain with diarrhea.   Onset: few days ago    Patient called office. Date of birth and full name both confirmed.   Triaged and advised care advice   For about 1 month abdominal pain 6 or 7 out of 10    But concerned Today - diarrhea 4-5 bouts of diarrhea since yesterday.   But less abdominal pain today.   Evaluation advised in office.     Advised to monitor symptoms.  RN advised if symptoms get severely worse, patient should seek care at Emergency Room or Immediate Care.  RN also informed patient to seek immediate medical attention at ER if patient experiences severe/worsening symptoms, shortness of breath, chest pain, or severe pain.  Patient verbalizes understanding and is agreeable to instructions.       Future Appointments   Date Time Provider Department Center   8/5/2024  3:00 PM Sri Villatoro APRN [facility]       Reason for Disposition   MODERATE diarrhea (e.g., 4-6 times / day more than normal) and present > 48 hours (2 days)    Protocols used: Diarrhea-A-OH

## 2024-08-05 NOTE — PROGRESS NOTES
Subjective:   Gamaliel Maldonado is a 58 year old male who presents for Abdominal Pain (Lower abdominal pain on and off getting worse for a couple of weeks now, having dark stools.)     C/c from July 20th to August 3rd he had bilateral lower abdominal pain with episodes of black stool. States it was not dark brown, but black. The last occurrence of black stool was last week. Yesterday he had 4-5 episodes of diarrhea which was normal color, mix of liquid and loose stool. Had one episode of this today. No BRBPR. Denies abdominal pain today. Last had mild abdominal pain yesterday. No nausea, vomiting, no loss of appetite or unintentional weight loss, denies feeling weak/shaky or lightheaded. Has had similar episodes of abdominal pain and diarrhea in the past, but denies previous episodes of black stool. He does have a diagnosis of IBS-mixed. He often fasts during the day and eats while he is working on the night shift. When he is at work he has to have a BM 5-10 minutes after eating. States this does not happen at home. He has not eaten anything yet today.   Wife does note that he was previously drinking heavily with at least 2 drinks a night on week nights and could finish half a bottle of whiskey on a Saturday. He completely cut out alcohol in July. The last time he had an alcoholic drink was last week.     Colonoscopy was last done in 2021, a few polyps were found and was given a 3 year recall - October of this year    Did not take amlodipine yet today - noted elevated BP    History/Other:    Chief Complaint Reviewed and Verified  Nursing Notes Reviewed and   Verified  Tobacco Reviewed  Allergies Reviewed  Medications Reviewed         Tobacco:  He smoked tobacco in the past but quit greater than 12 months ago.  Social History     Tobacco Use   Smoking Status Former    Current packs/day: 0.00    Types: Cigars, Cigarettes   Smokeless Tobacco Never   Tobacco Comments    2-3 cigars daily        Current Outpatient  Medications   Medication Sig Dispense Refill    Zolpidem Tartrate ER 12.5 MG Oral Tab CR Take 1 tablet (12.5 mg total) by mouth nightly as needed for Sleep. 30 tablet 1    alprazolam 2 MG Oral Tab Take 1 tablet (2 mg total) by mouth nightly as needed. 30 tablet 0    amLODIPine 10 MG Oral Tab Take 1 tablet (10 mg total) by mouth daily. 90 tablet 3    simvastatin 20 MG Oral Tab Take 1 tablet (20 mg total) by mouth nightly. 90 tablet 3    Zaleplon 10 MG Oral Cap Take 1 capsule (10 mg total) by mouth at bedtime. (Patient not taking: Reported on 8/5/2024) 30 capsule 0    Lancets (ONETOUCH DELICA PLUS [id number]) Does not apply Misc 1 strip by In Vitro route 2 (two) times daily. 200 each 0    Glucose Blood (ONETOUCH VERIO) In Vitro Strip Test 2x daily 200 strip 0    Blood Glucose Monitoring Suppl (ONETOUCH VERIO) w/Device Does not apply Kit 1 each daily. 1 kit 0    FLUoxetine 10 MG Oral Cap TAKE 1 CAPSULE BY MOUTH DAILY FOR 1 WEEK THEN INCREASE TO 2 CAPSULES BY MOUTH DAILY AT THE BEGINNING OF WEEK TWO. (Patient not taking: Reported on 8/5/2024) 60 capsule 0    Ergocalciferol (VITAMIN D CAPS 65668 IU OR) Take by mouth. (Patient not taking: Reported on 8/5/2024)      Eszopiclone 2 MG Oral Tab Take 1 tablet (2 mg total) by mouth nightly. (Patient not taking: Reported on 8/5/2024) 30 tablet 0     Review of Systems:  Review of Systems  10 point review of systems otherwise negative with the exception of HPI and assessment and plan    Objective:   /84   Pulse 59   Ht 5' 11\" (1.803 m)   Wt 195 lb 6.4 oz (88.6 kg)   SpO2 97%   BMI 27.25 kg/m²  Estimated body mass index is 27.25 kg/m² as calculated from the following:    Height as of this encounter: 5' 11\" (1.803 m).    Weight as of this encounter: 195 lb 6.4 oz (88.6 kg).  Physical Exam  Vitals reviewed.   Constitutional:       General: He is not in acute distress.     Appearance: Normal appearance. He is well-developed.   Cardiovascular:      Rate and Rhythm: Normal  rate and regular rhythm.      Heart sounds: Normal heart sounds.   Pulmonary:      Effort: Pulmonary effort is normal.      Breath sounds: Normal breath sounds.   Abdominal:      General: Bowel sounds are normal.      Palpations: Abdomen is soft. There is no mass.      Tenderness: There is no abdominal tenderness. There is no guarding.   Skin:     General: Skin is warm and dry.   Neurological:      Mental Status: He is alert and oriented to person, place, and time.       Assessment & Plan:   1. Black stools (Primary)  -     CT ABDOMEN (W+WO)/PELVIS ([facility] ONLY) (CPT=74178); Future; Expected date: 08/05/2024  -     Comp Metabolic Panel (14); Future; Expected date: 08/05/2024  -     CBC, Platelet; No Differential; Future; Expected date: 08/05/2024  -     Gastro Referral - In Network  2. Primary hypertension  Take amlodipine when he gets home  3. Lower abdominal pain  -     CT ABDOMEN (W+WO)/PELVIS ([facility] ONLY) (CPT=74178); Future; Expected date: 08/05/2024  -     Comp Metabolic Panel (14); Future; Expected date: 08/05/2024  -     CBC, Platelet; No Differential; Future; Expected date: 08/05/2024  -     Gastro Referral - In Network  4. Irritable bowel syndrome with both constipation and diarrhea  -     CT ABDOMEN (W+WO)/PELVIS ([facility] ONLY) (CPT=74178); Future; Expected date: 08/05/2024  -     Comp Metabolic Panel (14); Future; Expected date: 08/05/2024  -     Gastro Referral - In Network  5. History of heavy alcohol consumption  -     CT ABDOMEN (W+WO)/PELVIS ([facility] ONLY) (CPT=74178); Future; Expected date: 08/05/2024  -     Comp Metabolic Panel (14); Future; Expected date: 08/05/2024    Advised if he has severe abdominal pain, vomiting, or other severe symptoms he should go to ER  Sent message to GI RN as he is almost due for colonoscopy and may need EGD as well given black stools and heavy alcohol use     LISET Brady, 8/5/2024, 3:15 PM

## 2024-08-06 ENCOUNTER — TELEPHONE (OUTPATIENT)
Dept: GASTROENTEROLOGY | Facility: CLINIC | Age: 59
End: 2024-08-06

## 2024-08-06 DIAGNOSIS — Z86.0100 HISTORY OF COLONIC POLYPS: Primary | ICD-10-CM

## 2024-08-06 DIAGNOSIS — K92.1 MELENA: ICD-10-CM

## 2024-08-06 DIAGNOSIS — R10.9 ABDOMINAL PAIN, UNSPECIFIED ABDOMINAL LOCATION: ICD-10-CM

## 2024-08-06 DIAGNOSIS — Z12.11 SCREEN FOR COLON CANCER: ICD-10-CM

## 2024-08-06 NOTE — TELEPHONE ENCOUNTER
Left message for the pt to call back      Last Procedure, Date, MD:  10/26/2021  Last Diagnosis:  hyperplatic polyp  Recalled (mth/yrs): 3 yrs  Sedation Used Previously:  MAC  Last Prep Used (if known): Suprep   Quality Of Prep (if known): excellent  Anticoagulants: no  Diuretics: no  Diabetic Med's (PO/Injectables): no  Weight loss Med's:no  Iron/Herbal/Multivitamin Supplements (RX/OTC): Vitamin D  Marijuana/Vaping/CBD: no  Height & Weight: 5'11\"/195  BMI: 27.25  Hx of Cardiac/CVA Issues/(MI/Stroke): no  Devices Pacemaker/Defibrillator/Stents: no  Respiratory Issues/Oxygen Use/TIGIST/COPD: no  Issues w/ Anesthesia: no    Symptoms (Y/N): black stools, abdominal pain  Symptoms Details:     Special Comments/Notes:OV 09/10/24 with Dr Fontana. Medications allergies and pharmacy verified with the pt    Please advise on orders and prep for colonoscopy and ?EGD     Thank you!       John Jernigan MD   Physician  Gastroenterology     Operative Report     Signed     Date of Service: 10/26/2021  2:07 PM  Case Time: Procedures: Surgeons:   10/26/2021  2:28 PM COLONOSCOPY    John Jernigan MD               Signed         COLONOSCOPY PROCEDURE REPORT     DATE OF PROCEDURE:  10/26/2021      PCP: Medhat Fontana DO     PREOPERATIVE DIAGNOSIS:  history adenomatous colon polyps     POSTOPERATIVE DIAGNOSIS:  See impression.     SURGEON:  John Jernigan M.D.     SEDATION:    MAC anesthesia provided by the Anesthesia Service.  MAC anesthesia requested due to anticipated intolerance of colonoscopy examination under safe doses conscious sedation medications     COLONOSCOPY PROCEDURE:   After the nature and risks of colonoscopy examination under MAC anesthesia were discussed with the patient and all questions answered, informed consent was obtained.  The patient was sedated as above.       Digital rectal exam was performed which showed no masses.  The Olympus pediatric video colonoscope was placed in the  patient's rectum and advanced under direct visualization through the entire length of the colon up to the cecum and terminal ileum.  Retroflex exam performed up the ascending colon to the hepatic flexure.  The cecum was confirmed by landmarks including appendiceal orifice, cecal trifold, ileocecal valve.  Retroflexion was performed in the rectum.     The quality of the prep was excellent.     Estimated blood loss: < 10mL         COLONOSCOPY FINDINGS:    2 sessile polyps 5 and 6 mm removed from the mid and distal transverse colon by cold snare polypectomy, suctioned out.  5 sessile polyps 5-6 mm removed from the mid and distal sigmoid colon, suctioned out.  15 sessile polyps, 3-8 mm diameter removed from the rectosigmoid and rectum regions, suctioned out.  Many may have been hyperplastic.  Mild diverticulosis of the ascending, sigmoid colon.  Small internal hemorrhoids.     RECOMMENDATIONS:  High fiber diet.  Follow-up above colon polyp pathology results.  Repeat colonoscopy examination in 3 years.  No aspirin or NSAID medications for next 10 days to prevent bleeding            Electronically signed by John Jernigan MD at 10/26/2021  3:11 PM    ===================================  Final Diagnosis:      A. Transverse colon polyp x2:  Fragments of hyperplastic polyp.     B. Sigmoid colon polyp x5:  Multiple fragments of hyperplastic polyp.     C. Rectum polyp x15:  Multiple fragments of hyperplastic polyp.      Electronically signed by Colleen Matthew MD on 10/27/2021 at 11:32 AM

## 2024-08-06 NOTE — TELEPHONE ENCOUNTER
----- Message from Sri Villatoro sent at 8/5/2024  3:30 PM CDT -----  Hi GI staff,   I have a patient in clinic with history of black stools a few weeks ago and lower abdominal pain  He is due for colonoscopy in October but think he likely needs endoscopy as well- history of heavy alcohol consumption as well   Does have hx of IBS as well   I would appreciate your assistance in scheduling him with a provider - thank you!  Sri HUTCHINS

## 2024-08-21 DIAGNOSIS — F41.1 GAD (GENERALIZED ANXIETY DISORDER): ICD-10-CM

## 2024-08-22 RX ORDER — ALPRAZOLAM 2 MG
2 TABLET ORAL NIGHTLY PRN
Qty: 30 TABLET | Refills: 0 | Status: SHIPPED | OUTPATIENT
Start: 2024-08-22

## 2024-08-22 NOTE — TELEPHONE ENCOUNTER
Please review. Protocol Failed; No Protocol      Recent fills: 4/24/2024, 7/5/2024  Last Rx written: 7/5/2024  Last office visit: 4/300/2024        Requested Prescriptions   Pending Prescriptions Disp Refills    alprazolam 2 MG Oral Tab 30 tablet 0     Sig: Take 1 tablet (2 mg total) by mouth nightly as needed.       Controlled Substance Medication Failed - 8/21/2024  6:43 PM        Failed - This medication is a controlled substance - forward to provider to refill               Future Appointments         Provider Department Appt Notes    In 2 weeks LMB CT RM1 [facility] [place]           Recent Outpatient Visits              2 weeks ago Black stools    [facility] [place] Sri Villatoro APRN    Office Visit    3 months ago Colon cancer screening    [facility]     Office Visit    5 months ago Type 2 diabetes mellitus without complication, without long-term current use of insulin (HCC)    [facility]     Office Visit    10 months ago Type 2 diabetes mellitus without complication, without long-term current use of insulin (HCC)    [facility]     Office Visit    1 year ago Uncontrolled type 2 diabetes mellitus with hyperglycemia (HCC)    [facility]    Office Visit

## 2024-08-28 ENCOUNTER — TELEPHONE (OUTPATIENT)
Dept: FAMILY MEDICINE CLINIC | Facility: CLINIC | Age: 59
End: 2024-08-28

## 2024-08-28 NOTE — TELEPHONE ENCOUNTER
Spoke to patient regarding blood pressure check f/u. Pt was able to provide me w/ a reading and chart has been updated. 1st - 220/185  2nd - 253/189   I'm going to schedule an appt to see PCP.

## 2024-09-09 ENCOUNTER — HOSPITAL ENCOUNTER (OUTPATIENT)
Dept: CT IMAGING | Age: 59
Discharge: HOME | End: 2024-09-09
Payer: COMMERCIAL

## 2024-09-09 DIAGNOSIS — K58.2 IRRITABLE BOWEL SYNDROME WITH BOTH CONSTIPATION AND DIARRHEA: ICD-10-CM

## 2024-09-09 DIAGNOSIS — R10.30 LOWER ABDOMINAL PAIN: ICD-10-CM

## 2024-09-09 DIAGNOSIS — K92.1 BLACK STOOLS: ICD-10-CM

## 2024-09-09 DIAGNOSIS — Z87.898 HISTORY OF HEAVY ALCOHOL CONSUMPTION: ICD-10-CM

## 2024-09-09 PROCEDURE — 74177 CT ABD & PELVIS W/CONTRAST: CPT

## 2024-09-10 ENCOUNTER — LAB ENCOUNTER (OUTPATIENT)
Dept: LAB | Age: 59
End: 2024-09-10
Attending: FAMILY MEDICINE
Payer: COMMERCIAL

## 2024-09-10 ENCOUNTER — OFFICE VISIT (OUTPATIENT)
Dept: FAMILY MEDICINE CLINIC | Facility: CLINIC | Age: 59
End: 2024-09-10

## 2024-09-10 VITALS
BODY MASS INDEX: 27.72 KG/M2 | RESPIRATION RATE: 18 BRPM | OXYGEN SATURATION: 96 % | SYSTOLIC BLOOD PRESSURE: 138 MMHG | HEIGHT: 71 IN | TEMPERATURE: 98 F | HEART RATE: 92 BPM | WEIGHT: 198 LBS | DIASTOLIC BLOOD PRESSURE: 85 MMHG

## 2024-09-10 DIAGNOSIS — Z12.11 COLON CANCER SCREENING: ICD-10-CM

## 2024-09-10 DIAGNOSIS — R73.03 PRE-DIABETES: ICD-10-CM

## 2024-09-10 DIAGNOSIS — I10 ESSENTIAL HYPERTENSION WITH GOAL BLOOD PRESSURE LESS THAN 140/90: ICD-10-CM

## 2024-09-10 DIAGNOSIS — Z00.00 ROUTINE PHYSICAL EXAMINATION: ICD-10-CM

## 2024-09-10 DIAGNOSIS — E11.9 TYPE 2 DIABETES MELLITUS WITHOUT COMPLICATION, WITHOUT LONG-TERM CURRENT USE OF INSULIN (HCC): Primary | ICD-10-CM

## 2024-09-10 DIAGNOSIS — Z28.21 TETANUS, DIPHTHERIA, AND ACELLULAR PERTUSSIS (TDAP) VACCINATION DECLINED: ICD-10-CM

## 2024-09-10 DIAGNOSIS — Z28.21 VARICELLA ZOSTER VIRUS (VZV) VACCINATION DECLINED: ICD-10-CM

## 2024-09-10 DIAGNOSIS — F51.04 PSYCHOPHYSIOLOGICAL INSOMNIA: ICD-10-CM

## 2024-09-10 DIAGNOSIS — E11.9 TYPE 2 DIABETES MELLITUS WITHOUT COMPLICATION, WITHOUT LONG-TERM CURRENT USE OF INSULIN (HCC): ICD-10-CM

## 2024-09-10 LAB
BILIRUB UR QL: NEGATIVE
CHOLEST SERPL-MCNC: 248 MG/DL (ref ?–200)
COLOR UR: YELLOW
CREAT UR-SCNC: 332.9 MG/DL
FASTING PATIENT LIPID ANSWER: YES
GLUCOSE UR-MCNC: 30 MG/DL
HDLC SERPL-MCNC: 89 MG/DL (ref 40–59)
HEMOGLOBIN A1C: 6 % (ref 4.3–5.6)
HGB UR QL STRIP.AUTO: NEGATIVE
HYALINE CASTS #/AREA URNS AUTO: PRESENT /LPF
KETONES UR-MCNC: NEGATIVE MG/DL
LDLC SERPL CALC-MCNC: 135 MG/DL (ref ?–100)
LEUKOCYTE ESTERASE UR QL STRIP.AUTO: NEGATIVE
MICROALBUMIN UR-MCNC: 1.6 MG/DL
MICROALBUMIN/CREAT 24H UR-RTO: 4.8 UG/MG (ref ?–30)
NITRITE UR QL STRIP.AUTO: NEGATIVE
NONHDLC SERPL-MCNC: 159 MG/DL (ref ?–130)
PH UR: 5 [PH] (ref 5–8)
PROT UR-MCNC: 50 MG/DL
SP GR UR STRIP: 1.02 (ref 1–1.03)
TRIGL SERPL-MCNC: 139 MG/DL (ref 30–149)
TSI SER-ACNC: 1.59 MIU/ML (ref 0.55–4.78)
UROBILINOGEN UR STRIP-ACNC: NORMAL
VLDLC SERPL CALC-MCNC: 25 MG/DL (ref 0–30)

## 2024-09-10 PROCEDURE — 36415 COLL VENOUS BLD VENIPUNCTURE: CPT

## 2024-09-10 PROCEDURE — 82570 ASSAY OF URINE CREATININE: CPT

## 2024-09-10 PROCEDURE — 81001 URINALYSIS AUTO W/SCOPE: CPT

## 2024-09-10 PROCEDURE — 84443 ASSAY THYROID STIM HORMONE: CPT

## 2024-09-10 PROCEDURE — 3008F BODY MASS INDEX DOCD: CPT | Performed by: FAMILY MEDICINE

## 2024-09-10 PROCEDURE — 3044F HG A1C LEVEL LT 7.0%: CPT | Performed by: FAMILY MEDICINE

## 2024-09-10 PROCEDURE — 3079F DIAST BP 80-89 MM HG: CPT | Performed by: FAMILY MEDICINE

## 2024-09-10 PROCEDURE — 83036 HEMOGLOBIN GLYCOSYLATED A1C: CPT | Performed by: FAMILY MEDICINE

## 2024-09-10 PROCEDURE — 80061 LIPID PANEL: CPT

## 2024-09-10 PROCEDURE — 3075F SYST BP GE 130 - 139MM HG: CPT | Performed by: FAMILY MEDICINE

## 2024-09-10 PROCEDURE — 3061F NEG MICROALBUMINURIA REV: CPT | Performed by: FAMILY MEDICINE

## 2024-09-10 PROCEDURE — 99396 PREV VISIT EST AGE 40-64: CPT | Performed by: FAMILY MEDICINE

## 2024-09-10 PROCEDURE — 82043 UR ALBUMIN QUANTITATIVE: CPT

## 2024-09-10 RX ORDER — ZOLPIDEM TARTRATE 6.25 MG/1
6.25 TABLET, FILM COATED, EXTENDED RELEASE ORAL NIGHTLY PRN
Qty: 90 TABLET | Refills: 0 | Status: SHIPPED | OUTPATIENT
Start: 2024-09-10

## 2024-09-10 RX ORDER — AMLODIPINE BESYLATE 10 MG/1
10 TABLET ORAL DAILY
Qty: 90 TABLET | Refills: 3 | Status: SHIPPED | OUTPATIENT
Start: 2024-09-10

## 2024-09-10 NOTE — PATIENT INSTRUCTIONS
All adult screening ordered and done appropriate for patient's age and gender and risk factors and complaints.  Recommend weight loss via daily exercising and consistent healthy dietary changes.  Encouraged physical fitness and daily physical activity daily.  Medication reviewed and renewed where needed and appropriate.  Comply with medications.

## 2024-09-11 NOTE — PROGRESS NOTES
Subjective:     Patient ID: Gamaliel Maldonado is a 59 year old male.    This patient is a 59-year-old diabetic/Hypertensive -American male with a history of anxiety which is consistently triggered while at work or getting to work who here for complete preventive care physical and for status update on any confirmed chronic medical illnesses and follow up on any previous labs or procedures that were suggestive or in need of further work up. Colonoscopy is due. Bowel and bladder functions are intact.    Patient denies headaches, chest pain, dizziness, shortness of breath, acute vision changes, exertional fatigue, urinary frequency, and/or excessive hunger or thirst.    Tdap, pneumococcal, and shingles vaccine are recommended, but declined on today.    Patient recently seen in an emergency room setting with regards to acute abdominal discomfort and distention/bloating.  CT scan was unremarkable.  There was no mass and no trace of appendicitis.  Noted was gas pockets and stool.  Patient reports that his discomfort felt spastic and it migrated.  Patient reports incomplete bowel movements where he has to return to the restroom right after trying to eliminate.  There is no blood in the stool.  There is no black or maroon-colored stools.  There is no reported vomiting.    Patient does have a history for anxiety and this is likely the gastrointestinal systems form of reactants that his stress.  This is likely IBS.  Patient is due for upcoming colonoscopy and this will serve at appropriate time to help rule out any other GI pathology and confirm IBS.            History/Other:   Review of Systems  Current Outpatient Medications   Medication Sig Dispense Refill    amLODIPine 10 MG Oral Tab Take 1 tablet (10 mg total) by mouth daily. 90 tablet 3    Zolpidem Tartrate ER 6.25 MG Oral Tab CR Take 1 tablet (6.25 mg total) by mouth nightly as needed for Sleep. 90 tablet 0    alprazolam 2 MG Oral Tab Take 1 tablet (2 mg total) by  mouth nightly as needed. 30 tablet 0    Zolpidem Tartrate ER 12.5 MG Oral Tab CR Take 1 tablet (12.5 mg total) by mouth nightly as needed for Sleep. 30 tablet 1    Zaleplon 10 MG Oral Cap Take 1 capsule (10 mg total) by mouth at bedtime. (Patient not taking: Reported on 8/5/2024) 30 capsule 0    simvastatin 20 MG Oral Tab Take 1 tablet (20 mg total) by mouth nightly. (Patient not taking: Reported on 9/10/2024) 90 tablet 3    Lancets (ONETOUCH DELICA PLUS ZVPIZF32H) Does not apply Misc 1 strip by In Vitro route 2 (two) times daily. (Patient not taking: Reported on 9/10/2024) 200 each 0    Glucose Blood (ONETOUCH VERIO) In Vitro Strip Test 2x daily (Patient not taking: Reported on 9/10/2024) 200 strip 0    Blood Glucose Monitoring Suppl (ONETOUCH VERIO) w/Device Does not apply Kit 1 each daily. (Patient not taking: Reported on 9/10/2024) 1 kit 0    FLUoxetine 10 MG Oral Cap TAKE 1 CAPSULE BY MOUTH DAILY FOR 1 WEEK THEN INCREASE TO 2 CAPSULES BY MOUTH DAILY AT THE BEGINNING OF WEEK TWO. (Patient not taking: Reported on 8/5/2024) 60 capsule 0    Ergocalciferol (VITAMIN D CAPS 24768 IU OR) Take by mouth. (Patient not taking: Reported on 9/10/2024)      Eszopiclone 2 MG Oral Tab Take 1 tablet (2 mg total) by mouth nightly. (Patient not taking: Reported on 8/5/2024) 30 tablet 0     Allergies:No Known Allergies    Past Medical History:    Diabetes (HCC)    Essential hypertension    High blood pressure      Past Surgical History:   Procedure Laterality Date    Colonoscopy N/A 3/20/2017    Procedure: COLONOSCOPY;  Surgeon: John Jernigan MD;  Location: [facility] ENDOSCOPY    Colonoscopy N/A 10/26/2021    Procedure: COLONOSCOPY;  Surgeon: John Jernigan MD;  Location: [facility] ENDOSCOPY    Elbow surgery Right 2010    right elbow ligament torn     Foot surgery Left 1999    archilles tendon torn    Hernia surgery        Family History   Problem Relation Age of Onset    Diabetes Mother       Social History:   Social  History     Socioeconomic History    Marital status: Single   Tobacco Use    Smoking status: Former     Current packs/day: 0.00     Types: Cigars, Cigarettes    Smokeless tobacco: Never    Tobacco comments:     2-3 cigars daily   Vaping Use    Vaping status: Never Used   Substance and Sexual Activity    Alcohol use: Yes     Alcohol/week: 0.0 standard drinks of alcohol     Comment: 2 beers a day and possibly scotch    Drug use: No        Objective:   Vitals:    09/10/24 1530   BP: 138/85   Pulse:    Resp:    Temp:        Physical Exam  Constitutional:       General: He is not in acute distress.     Appearance: Normal appearance. He is not ill-appearing.   HENT:      Head: Normocephalic and atraumatic.      Right Ear: Tympanic membrane normal.      Left Ear: Tympanic membrane normal.      Nose: Nose normal.      Mouth/Throat:      Mouth: Mucous membranes are moist.   Neck:      Thyroid: No thyromegaly.   Cardiovascular:      Rate and Rhythm: Normal rate and regular rhythm.   Pulmonary:      Effort: Pulmonary effort is normal.      Breath sounds: Normal breath sounds.   Abdominal:      Palpations: Abdomen is soft. There is no mass.      Tenderness: There is abdominal tenderness.      Comments: There is achiness in several of the patient's abdominal quadrants with moderate palpation   Neurological:      Mental Status: He is alert and oriented to person, place, and time.   Psychiatric:         Behavior: Behavior normal.         Assessment & Plan:   1. Routine physical examination  General well exam with the following has been ordered.  - Urinalysis, Routine [E]; Future  - Lipid Panel [E]; Future  - TSH [E]; Future    2. Type 2 diabetes mellitus without complication, without long-term current use of insulin ([facility])  Diabetic status update and patient has been referred to ophthalmology for dilated/diabetic eye exam.  - POC Glycohemoglobin [90864]  - Ophthalmology Referral - In Network    3. Essential hypertension with goal  blood pressure less than 140/90  By criteria the blood pressure measures to goal.  Compliance with medication emphasized and encouraged.  - amLODIPine 10 MG Oral Tab; Take 1 tablet (10 mg total) by mouth daily.  Dispense: 90 tablet; Refill: 3    4. Colon cancer screening  Referred for routine and also evaluation for IBS.  - Gastro Referral - [facility])    5. Tetanus, diphtheria, and acellular pertussis (Tdap) vaccination declined  Declined.  - TETANUS, DIPHTHERIA TOXOIDS AND ACELLULAR PERTUSIS VACCINE (TDAP), >7 YEARS, IM USE    6. Varicella zoster virus (VZV) vaccination declined  Declined by patient.  - Zoster Recombinant Adjuvanted (Shingrix -Shingles) [74581]    7. Psychophysiological insomnia  Medication reviewed and renewed.  - Zolpidem Tartrate ER 6.25 MG Oral Tab CR; Take 1 tablet (6.25 mg total) by mouth nightly as needed for Sleep.  Dispense: 90 tablet; Refill: 0      Orders Placed This Encounter   Procedures    POC Glycohemoglobin [77952]    Urinalysis, Routine [E]    Lipid Panel [E]    TSH [E]    TETANUS, DIPHTHERIA TOXOIDS AND ACELLULAR PERTUSIS VACCINE (TDAP), >7 YEARS, IM USE    Zoster Recombinant Adjuvanted (Shingrix -Shingles) [35193]       Meds This Visit:  Requested Prescriptions     Signed Prescriptions Disp Refills    amLODIPine 10 MG Oral Tab 90 tablet 3     Sig: Take 1 tablet (10 mg total) by mouth daily.    Zolpidem Tartrate ER 6.25 MG Oral Tab CR 90 tablet 0     Sig: Take 1 tablet (6.25 mg total) by mouth nightly as needed for Sleep.       Imaging & Referrals:  TETANUS, DIPHTHERIA TOXOIDS AND ACELLULAR PERTUSIS VACCINE (TDAP), >7 YEARS, IM USE  ZOSTER VACC RECOMBINANT IM NJX  OPHTHALMOLOGY - INTERNAL  GASTRO - INTERNAL     Patient Instructions   All adult screening ordered and done appropriate for patient's age and gender and risk factors and complaints.  Recommend weight loss via daily exercising and consistent healthy dietary changes.  Encouraged physical fitness and daily  physical activity daily.  Medication reviewed and renewed where needed and appropriate.  Comply with medications.      Return in about 1 year (around 9/10/2025), or if symptoms worsen or fail to improve.

## 2024-09-30 ENCOUNTER — TELEPHONE (OUTPATIENT)
Dept: FAMILY MEDICINE CLINIC | Facility: CLINIC | Age: 59
End: 2024-09-30

## 2024-09-30 NOTE — TELEPHONE ENCOUNTER
Dr Fontana =patient wants to talk to you , thanks.        Per patient, he went to ED on 9/18/24 due to anxiety/panic attack.   He is taking alprazolam as needed.   He is not seeing any therapist yet , RN instructed to contact Navin Sommers at [phone].  He would like to talk to Dr Fontana ONLY .        Per schedule review, Dr Fontana will be out of office starting tomorrow and will be back on 10/15/24. [employer] message sent .       No future appointments.

## 2024-10-01 NOTE — TELEPHONE ENCOUNTER
Called patient,verified name and date of birth.  Advised patient that Dr Fontana sent the letter he requested via [employer]. Patient reports he knows how to access the letter and has no further questions.

## 2024-10-01 NOTE — TELEPHONE ENCOUNTER
Letter has been sent to the patient's MyChart..  Please call the patient and let him know.  If he has a problem obtaining the letter, please assist him.  Thank you.

## 2024-10-02 ENCOUNTER — TELEPHONE (OUTPATIENT)
Dept: FAMILY MEDICINE CLINIC | Facility: CLINIC | Age: 59
End: 2024-10-02

## 2024-10-07 ENCOUNTER — TELEPHONE (OUTPATIENT)
Dept: FAMILY MEDICINE CLINIC | Facility: CLINIC | Age: 59
End: 2024-10-07

## 2024-10-08 NOTE — TELEPHONE ENCOUNTER
Patient called to check status of forms. Informed patient Family Medical Leave Act form received. Informed patient Dr. Fontana is currently out of office. No Release of Information on file. Patient sent [employer] message.  Details were provided.    Type of Leave: Family Medical Leave Act continuous  Reason for Leave: anxiety  Start date of leave: 9/30/24  End date of leave: 10/21/24  Return to work : 10/22/24 (Letter in chart)   How many flare ups per month/length?:  How many appts per month/length?:   Was Fee and Turnaround info Given?: Yes

## 2024-10-09 DIAGNOSIS — F51.04 PSYCHOPHYSIOLOGICAL INSOMNIA: ICD-10-CM

## 2024-10-09 DIAGNOSIS — F41.1 GAD (GENERALIZED ANXIETY DISORDER): ICD-10-CM

## 2024-10-11 RX ORDER — ZOLPIDEM TARTRATE 6.25 MG/1
6.25 TABLET, FILM COATED, EXTENDED RELEASE ORAL NIGHTLY PRN
Qty: 90 TABLET | Refills: 0 | OUTPATIENT
Start: 2024-10-11

## 2024-10-11 RX ORDER — SODIUM, POTASSIUM,MAG SULFATES 17.5-3.13G
SOLUTION, RECONSTITUTED, ORAL ORAL
Qty: 1 EACH | Refills: 0 | Status: SHIPPED | OUTPATIENT
Start: 2024-10-11

## 2024-10-11 RX ORDER — ALPRAZOLAM 2 MG
2 TABLET ORAL NIGHTLY PRN
Qty: 30 TABLET | Refills: 0 | Status: SHIPPED | OUTPATIENT
Start: 2024-10-11

## 2024-10-11 NOTE — TELEPHONE ENCOUNTER
Please review. Protocol Failed; No Protocol      Recent fills: 4/24/2024, 7/5/2024, 8/22/2024  Last Rx written: 8/22/2024  Last office visit: 9/10/2024          Requested Prescriptions   Pending Prescriptions Disp Refills    alprazolam 2 MG Oral Tab 30 tablet 0     Sig: Take 1 tablet (2 mg total) by mouth nightly as needed.       Controlled Substance Medication Failed - 10/11/2024 11:07 AM        Failed - This medication is a controlled substance - forward to provider to refill         Refused Prescriptions Disp Refills    Zolpidem Tartrate ER 6.25 MG Oral Tab CR 90 tablet 0     Sig: Take 1 tablet (6.25 mg total) by mouth nightly as needed for Sleep.       Controlled Substance Medication Failed - 10/11/2024 11:07 AM        Failed - This medication is a controlled substance - forward to provider to refill                 Recent Outpatient Visits              1 month ago Type 2 diabetes mellitus without complication, without long-term current use of insulin ([facility])    [facility]    Office Visit    2 months ago Black stools    [facility]    Office Visit    5 months ago Colon cancer screening    [facility]    Office Visit    6 months ago Type 2 diabetes mellitus without complication, without long-term current use of insulin ([facility])    [facility] [place]    Office Visit    11 months ago Type 2 diabetes mellitus without complication, without long-term current use of insulin ([facility])    [facility]    Office Visit

## 2024-10-11 NOTE — TELEPHONE ENCOUNTER
Thanks all.    Recent OV 09/10/24 with Dr Medhat Fontana.  Recent ED eval w IV contrast CT scan 9/10/2024 reassuring.  Possible enlarged prostate; recent normal PSA; hepatic steatosis in this pt w concern for EtOH ?abuse.    Pt voices concern w abd pain, dark stools.    My previous CLN op report 10/26/2021 reviewed.    GI schedulers, please Schedule EGD & colonoscopy exam at [facility] ([facility])    BMI Readings from Last 1 Encounters:   09/10/24 27.62 kg/m²      MAC anesthesia     Suprep small volume bowel prep if covered by insurance, otherwise   Golytely (PEG) 4L bowel prep  Rx sent in to [facility]    Dx = Abdominal pain, melenic stools, history of colon polyps, screening colonoscopy     Medication instructions:    None

## 2024-10-15 ENCOUNTER — TELEPHONE (OUTPATIENT)
Dept: FAMILY MEDICINE CLINIC | Facility: CLINIC | Age: 59
End: 2024-10-15

## 2024-10-15 NOTE — TELEPHONE ENCOUNTER
Patient asking for an er followup before 10-23-24.    Patient was at Emergency Room in [place]  on 9-18-24.       He has paperwork fmla that is due right away.    Call patient with an appointment:    [phone]     Video okay  if okay with Dr Fontana  Patient declines another provider    Please reply to pool: EM CC IM FM ALG RHE [[id number]]

## 2024-10-16 NOTE — TELEPHONE ENCOUNTER
Dr. Fontana,    Please sign off on form if you agree to: Family Medical Leave Act due to anxiety start:9/30/24-end:10/21/24    -Signature page will be the first page scanned  -From your Inbasket, Highlight the patient and click Chart   -Double click the 10/2/24 Forms Completion telephone encounter  -Scroll down to the Media section   -Click the blue Hyperlink: Family Medical Leave Act  10/16/24    -Click Acknowledge located in the top right ribbon/menu   -Drag the mouse into the blank space of the document and a + sign will appear. Left click to   electronically sign the document.  -Once signed, simply exit out of the screen and you signature will be saved.     Thank you,     Laurita

## 2024-10-17 NOTE — TELEPHONE ENCOUNTER
Since the FMLA form has been signed, as there is still urgency regarding this follow-up?  Is the patient stable?  This will help me determine want to try to fit him in.  Thank you.

## 2024-10-17 NOTE — TELEPHONE ENCOUNTER
Patient found visit time today that was open and booked for it    Future Appointments   Date Time Provider Department Center   10/17/2024  4:20 PM [facility]     He will keep this visit as scheduled.

## 2024-10-18 ENCOUNTER — TELEPHONE (OUTPATIENT)
Dept: FAMILY MEDICINE CLINIC | Facility: CLINIC | Age: 59
End: 2024-10-18

## 2024-10-18 NOTE — TELEPHONE ENCOUNTER
Family Medical Leave Act completed and faxed to [employer] [phone]. Sent patient [employer] message

## 2024-10-18 NOTE — TELEPHONE ENCOUNTER
Patient dropped off FMLA, jarrett was signed fee was paid. Forms will be sent to the forms department.

## 2024-10-22 NOTE — TELEPHONE ENCOUNTER
Received Family Medical Leave Act forms with Valid Release of Information. Scanned Authorization into chart . Logged for Processing.

## 2024-10-26 NOTE — TELEPHONE ENCOUNTER
Scheduled for:  Colonoscopy 47308 EGD 60432  Provider Name:     Date:  Friday, 04/18/2025  Location:  [facility]  Sedation:  MAC  Time:  830am (pt is aware Endo will call with arrival time     Prep:  Suprep   Meds/Allergies Reconciled?:  Yes    Diagnosis with codes:   Abdominal pain, R10.9 melenic stools,K92.1 history of colon polyps,Z86.010 screening colonoscopy Z12.11     Was patient informed to call insurance with codes (Y/N):  Yes     Referral sent?:  Referral was sent at the time of electronic surgical scheduling.    EM or [facility] notified?:  I sent an electronic request to Endo Scheduling and received a confirmation today.       Medication Orders:  None  Misc Orders:  None     Further instructions given by staff:  I discussed the prep instructions with the patient which he verbally understood and is aware that I will send the instructions today.via [employer]

## 2024-10-31 NOTE — TELEPHONE ENCOUNTER
Received [facility] papers with new continuous leave date request today through fax. Emailed forms to forms department

## 2024-10-31 NOTE — TELEPHONE ENCOUNTER
Dr. Fontana    Please sign off on form if you agree to: Disability    -Signature page will be the first page scanned  -From your Inbasket, Highlight the patient and click Chart   -Double click the 10/18/24 Forms Completion telephone encounter  -Scroll down to the Media section   -Click the blue Hyperlink: disability  Dr. Fontana  10/31/24  -Click Acknowledge located in the top right ribbon/menu   -Drag the mouse into the blank space of the document and a + sign will appear. Left click to   electronically sign the document.  -Once signed, simply exit out of the screen and you signature will be saved.     Thank you,    Isrrael BOSWELL

## 2024-10-31 NOTE — TELEPHONE ENCOUNTER
Patient called forms dept. Patient calling on status on forms. Informed patient we received his forms on 10/18 and current turn around time is 7-10 business days. Informed patient forms have already been filled out. Continuous Family Medical Leave Act, patient stated yes and his leave has been extended. Forms he dropped off are not new patient just needs extension until 11/06/24 when he sees specialist, letter on file from Dr Fontana. Informed patient we will revise his forms and fax them out. Patient verbalized understanding. Patient also concerned about disability forms, informed patient we have not received any disability forms, patient was confused with letter he dropped off with his forms on 10/18/24. Informed patient forms he dropped off on 10/18 are the same forms he had completed. We will revise and fax out with new forms he dropped off. Patient verbalized understanding.

## 2024-10-31 NOTE — TELEPHONE ENCOUNTER
Family Medical Leave Act revised and faxed to [employer] [phone]. Confirmation received, [employer] message sent with copy of forms.

## 2024-10-31 NOTE — TELEPHONE ENCOUNTER
Patient called back and stated he spoke with his  Maxine at [facility]. Per Maxine disability forms were faxed to Drs office on 10/07/24. Disability forms were received in our dept 10/07/24. Forms were marked as Duplicate. Apologized to patient and informed we will work on his forms stat and fax out to [facility] once completed and signed. Patient requesting a copy be uploaded to [employer] once faxed. Patient also requesting a copy of revised Family Medical Leave Act form to be uploaded to [employer] once faxed.

## 2024-11-01 NOTE — TELEPHONE ENCOUNTER
Dr. Fontana,    I do apologize there must be a technical issue, your electronic signature didn't capture. If you can please sign off    Dr. Fontana     Please sign off on form if you agree to: Disability     -Signature page will be the first page scanned  -From your Inbasket, Highlight the patient and click Chart   -Double click the 10/18/24 Forms Completion telephone encounter  -Scroll down to the Media section   -Click the blue Hyperlink: disability  Dr. Fontana  10/31/24  -Click Acknowledge located in the top right ribbon/menu   -Drag the mouse into the blank space of the document and a + sign will appear. Left click to   electronically sign the document.  -Once signed, simply exit out of the screen and you signature will be saved.     Thank you,     Isrrael BOSWELL

## 2024-11-04 NOTE — TELEPHONE ENCOUNTER
Disability forms completed and signed by provider. Faxed to [employer] [phone] confirmation received.

## 2024-11-07 ENCOUNTER — TELEPHONE (OUTPATIENT)
Dept: FAMILY MEDICINE CLINIC | Facility: CLINIC | Age: 59
End: 2024-11-07

## 2024-11-07 NOTE — TELEPHONE ENCOUNTER
Received forms over through fax with new [facility] extension dates. Emailed forms to forms department.

## 2024-11-11 NOTE — TELEPHONE ENCOUNTER
Dr. Fontana,     Patient is requesting extension for Family Medical Leave Act until 12/16/24 pending re-eval with therapist due to anxiety/panic attacks. Do you support?    Thank you so much for your time,  Alisha mcdowell

## 2024-11-15 NOTE — TELEPHONE ENCOUNTER
Dr. Fontana,    Please sign off on form if you agree to: Disability from 9/30/24 to pending re-eval with therapist 12/16/24 due to anxiety/panic attacks.    -Signature page will be the first page scanned  -From your Inbasket, Highlight the patient and click Chart   -Double click the 11/7/24 Forms Completion telephone encounter  -Scroll down to the Media section   -Click the blue Hyperlink: Disability, Medhat Fontana, 11/15/24   -Click Acknowledge located in the top right ribbon/menu   -Drag the mouse into the blank space of the document and a + sign will appear. Left click to   electronically sign the document.  -Once signed, simply exit out of the screen and you signature will be saved.     Thank you,  Alisha BARRIENTOS

## 2024-11-19 NOTE — TELEPHONE ENCOUNTER
Forms completed and efaxed to [facility] [phone]. [employer] message sent, fax confirmed.

## 2024-11-28 DIAGNOSIS — F51.04 PSYCHOPHYSIOLOGICAL INSOMNIA: ICD-10-CM

## 2024-11-28 DIAGNOSIS — F41.1 GAD (GENERALIZED ANXIETY DISORDER): ICD-10-CM

## 2024-12-02 RX ORDER — ZOLPIDEM TARTRATE 12.5 MG/1
12.5 TABLET, FILM COATED, EXTENDED RELEASE ORAL NIGHTLY PRN
Qty: 30 TABLET | Refills: 1 | OUTPATIENT
Start: 2024-12-02

## 2024-12-02 NOTE — TELEPHONE ENCOUNTER
Please review; protocol failed/No Protocol    Recent Fills: 07/05/2024, 08/22/2024, 10/11/2024    Last Rx Written: 10/11/2024    Last Office Visit: 10/17/2024    Requested Prescriptions   Pending Prescriptions Disp Refills    alprazolam 2 MG Oral Tab 30 tablet 0     Sig: Take 1 tablet (2 mg total) by mouth nightly as needed.       Controlled Substance Medication Failed - 12/2/2024  1:03 PM        Failed - This medication is a controlled substance - forward to provider to refill           Future Appointments         Provider Department Appt Notes    [facility] 4 [facility]          Recent Outpatient Visits              1 month ago Panic attack    [facility] Medhat Fontana, DO    Office Visit    2 months ago Type 2 diabetes mellitus without complication, without long-term current use of insulin ([facility])    [facility]    Office Visit    3 months ago Black stools    [facility] One[facility] APRN    Office Visit    7 months ago Colon cancer screening    [facility] Medhat Fontana, DO    Office Visit    8 months ago Type 2 diabetes mellitus without complication, without long-term current use of insulin ([facility])    [facility]    Office Visit

## 2024-12-04 RX ORDER — ALPRAZOLAM 2 MG/1
2 TABLET ORAL NIGHTLY PRN
Qty: 30 TABLET | Refills: 0 | Status: SHIPPED | OUTPATIENT
Start: 2024-12-04

## 2024-12-06 ENCOUNTER — TELEPHONE (OUTPATIENT)
Dept: FAMILY MEDICINE CLINIC | Facility: CLINIC | Age: 59
End: 2024-12-06

## 2024-12-06 NOTE — TELEPHONE ENCOUNTER
Dr. Fontana,    Please sign off on form if you agree to: Medical certification/Continuous disability from 11/11/24 to 12/15/24.     -Signature page will be the first page scanned  -From your Inbasket, Highlight the patient and click Chart   -Double click the 12/6/24 Forms Completion telephone encounter  -Scroll down to the Media section   -Click the blue Hyperlink: Medical certificationMedhat, 12/6/24    -Click Acknowledge located in the top right ribbon/menu   -Drag the mouse into the blank space of the document and a + sign will appear. Left click to   electronically sign the document.  -Once signed, simply exit out of the screen and you signature will be saved.     Thank you,  Alisha BARRIENTOS

## 2024-12-10 DIAGNOSIS — F51.04 PSYCHOPHYSIOLOGICAL INSOMNIA: ICD-10-CM

## 2024-12-18 NOTE — TELEPHONE ENCOUNTER
RN Triage - please call patient, has patient been able to have appointment with psychiatry?  There are refills of zolpidem ER 6.25mg at [facility] sent #90 10/17, only filled #30.  As per note from last office visit, Dr. Fontana said sleep medication should come from psychiatrist.        Office visit 10/17/24, zolpidem decreased to ER 6.25mg, prescription sent. Patient was previously prescribed ER 12.5mg -prescription still active on chart.  [Per Lexidrug: Total dosage should not exceed 12.5 mg of extended-release zolpidem tartrate given once daily immediately before bedtime.]    Patient Instructions 10/17/24   Patient is officially totally disabled until stated otherwise.  Patient is going to require consultation for psychiatry along with general consultation.  No duration can be placed on this status for now.  Medication for sedation and/or sleep to be determined by or adjusted by psychiatrist.     Zolpidem Recent fills each # 30 : 7/5 12.5mg, 8/19 12.5mg, 10/18 6.25mg  Last prescription written: 10/17/24 6.25mg, 7/5/24 12.5mg  Last office visit: 10/17/24

## 2024-12-19 RX ORDER — ZOLPIDEM TARTRATE 12.5 MG/1
12.5 TABLET, FILM COATED, EXTENDED RELEASE ORAL NIGHTLY PRN
Qty: 30 TABLET | Refills: 1 | Status: SHIPPED | OUTPATIENT
Start: 2024-12-19

## 2024-12-19 NOTE — TELEPHONE ENCOUNTER
Patient stated that he is seeing a psychiatrist but he is not prescribing ambien, stated that Dr Fontana was prescribing it. Patient stated that Dr Fontana decreased the dose of ambien from 12.5mg to 6.25mg to see how patient would do on the lower dose. Patient stated that he wants to go back to the 12.5mg zolpidem, that the 6.25mg is not working for him -not strong enough. Please advise.

## 2025-01-15 DIAGNOSIS — F41.1 GAD (GENERALIZED ANXIETY DISORDER): ICD-10-CM

## 2025-01-20 RX ORDER — ALPRAZOLAM 2 MG/1
2 TABLET ORAL NIGHTLY PRN
Qty: 30 TABLET | Refills: 0 | Status: SHIPPED | OUTPATIENT
Start: 2025-01-20

## 2025-01-20 NOTE — TELEPHONE ENCOUNTER
Please kindly review; protocol failed or medication has no protocol attached.     Recent fill dates: 12/4/24, 10/11/24, and 8/22/24  Date of  last written prescription:    Last written quantity: #30 each and processed as a 30 day supply  [x] Takes as needed  [] Takes scheduled daily    Recent Visits  Date Type Provider Dept   10/17/24 Office Visit Medhat Fontana,  [facility]     Future Appointments  None     Requested Prescriptions   Pending Prescriptions Disp Refills    alprazolam 2 MG Oral Tab 30 tablet 0     Sig: Take 1 tablet (2 mg total) by mouth nightly as needed.       Controlled Substance Medication Failed - 1/20/2025  2:27 PM        Failed - This medication is a controlled substance - forward to provider to refill        Passed - Medication is active on med list             Future Appointments         Provider Department Appt Notes    In 2 months [facility]          Recent Outpatient Visits              3 months ago Panic attack    [facility] Medhat Fontana,     Office Visit    4 months ago Type 2 diabetes mellitus without complication, without long-term current use of insulin ([facility])    [facility] Medhat Fontana,     Office Visit    5 months ago [facility]    Office Visit    8 months ago Colon cancer screening    [facility] Medhat Fontana,     Office Visit    10 months ago Type 2 diabetes mellitus without complication, without long-term current use of insulin (HCC)    [facility] Medhat Hwang, DO    Office Visit

## 2025-01-26 DIAGNOSIS — F51.04 PSYCHOPHYSIOLOGICAL INSOMNIA: ICD-10-CM

## 2025-01-30 NOTE — TELEPHONE ENCOUNTER
Clarification needed. Awaiting patient response.    Requested Prescriptions   Pending Prescriptions Disp Refills    ZALEPLON 10 MG Oral Cap [Pharmacy Med Name: ZALEPLON 10MG CAPSULES] 30 capsule 0     Sig: TAKE 1 CAPSULE(10 MG) BY MOUTH AT BEDTIME       Controlled Substance Medication Failed - 1/30/2025  1:37 PM        Failed - This medication is a controlled substance - forward to provider to refill        Failed - Medication is active on med list

## 2025-02-03 RX ORDER — ZALEPLON 10 MG/1
10 CAPSULE ORAL NIGHTLY
Qty: 30 CAPSULE | Refills: 5 | Status: SHIPPED | OUTPATIENT
Start: 2025-02-03

## 2025-02-03 NOTE — TELEPHONE ENCOUNTER
Please review.  Protocol Failed.    No Recent fills  Last prescription written: 5/28/24  Last office visit:  10/17/2024        Requested Prescriptions   Pending Prescriptions Disp Refills    ZALEPLON 10 MG Oral Cap [Pharmacy Med Name: ZALEPLON 10MG CAPSULES] 30 capsule 5     Sig: TAKE 1 CAPSULE(10 MG) BY MOUTH AT BEDTIME       Controlled Substance Medication Failed - 2/3/2025  8:56 AM        Failed - This medication is a controlled substance - forward to provider to refill        Failed - Medication is active on med list           Future Appointments         Provider Department Appt Notes    In 2 months [facility]           Recent Outpatient Visits              3 months ago Panic attack    [facility] Medhat Fontana, DO    Office Visit    4 months ago Type 2 diabetes mellitus without complication, without long-term current use of insulin ([facility])    [facility]    Office Visit    6 months ago [facility] stools    [facility]    Office Visit    9 months ago Colon cancer screening    [facility] Medhat Fontana, DO    Office Visit    10 months ago Type 2 diabetes mellitus without complication, without long-term current use of insulin ([facility])    [facility]    Office Visit

## 2025-02-13 DIAGNOSIS — F41.1 GAD (GENERALIZED ANXIETY DISORDER): ICD-10-CM

## 2025-02-18 RX ORDER — ALPRAZOLAM 2 MG/1
2 TABLET ORAL NIGHTLY PRN
Qty: 30 TABLET | Refills: 0 | Status: SHIPPED | OUTPATIENT
Start: 2025-02-18

## 2025-02-18 NOTE — TELEPHONE ENCOUNTER
Please review; protocol failed/ has no protocol      Recent fills: 01/21/2025,12/04/2024,10/11/2024  Last Rx written: 01/20/2025  Last Office Visit: 10/17/2024    Recent Visits  Date Type Provider Dept   10/17/24 Office Visit Medhat Fontana,  [facility]

## 2025-02-24 ENCOUNTER — PATIENT MESSAGE (OUTPATIENT)
Dept: FAMILY MEDICINE CLINIC | Facility: CLINIC | Age: 60
End: 2025-02-24

## 2025-02-24 DIAGNOSIS — F51.04 PSYCHOPHYSIOLOGICAL INSOMNIA: ICD-10-CM

## 2025-02-25 ENCOUNTER — NURSE TRIAGE (OUTPATIENT)
Dept: FAMILY MEDICINE CLINIC | Facility: CLINIC | Age: 60
End: 2025-02-25

## 2025-02-25 ENCOUNTER — OFFICE VISIT (OUTPATIENT)
Dept: FAMILY MEDICINE CLINIC | Facility: CLINIC | Age: 60
End: 2025-02-25

## 2025-02-25 ENCOUNTER — TELEPHONE (OUTPATIENT)
Dept: FAMILY MEDICINE CLINIC | Facility: CLINIC | Age: 60
End: 2025-02-25

## 2025-02-25 VITALS
BODY MASS INDEX: 28.14 KG/M2 | HEIGHT: 71 IN | WEIGHT: 201 LBS | DIASTOLIC BLOOD PRESSURE: 82 MMHG | SYSTOLIC BLOOD PRESSURE: 136 MMHG | HEART RATE: 63 BPM

## 2025-02-25 DIAGNOSIS — M10.072 ACUTE IDIOPATHIC GOUT INVOLVING TOE OF LEFT FOOT: Primary | ICD-10-CM

## 2025-02-25 PROCEDURE — 3075F SYST BP GE 130 - 139MM HG: CPT | Performed by: PHYSICIAN ASSISTANT

## 2025-02-25 PROCEDURE — 3079F DIAST BP 80-89 MM HG: CPT | Performed by: PHYSICIAN ASSISTANT

## 2025-02-25 PROCEDURE — 99213 OFFICE O/P EST LOW 20 MIN: CPT | Performed by: PHYSICIAN ASSISTANT

## 2025-02-25 PROCEDURE — 3008F BODY MASS INDEX DOCD: CPT | Performed by: PHYSICIAN ASSISTANT

## 2025-02-25 RX ORDER — INDOMETHACIN 50 MG/1
50 CAPSULE ORAL
Qty: 30 CAPSULE | Refills: 0 | Status: SHIPPED | OUTPATIENT
Start: 2025-02-25

## 2025-02-25 RX ORDER — COLCHICINE 0.6 MG/1
0.6 TABLET ORAL DAILY
Qty: 7 TABLET | Refills: 0 | Status: SHIPPED | OUTPATIENT
Start: 2025-02-25 | End: 2025-03-04

## 2025-02-25 RX ORDER — PREDNISONE 20 MG/1
20 TABLET ORAL DAILY
Qty: 5 TABLET | Refills: 0 | Status: SHIPPED | OUTPATIENT
Start: 2025-02-25 | End: 2025-03-02

## 2025-02-25 NOTE — PROGRESS NOTES
HPI:     HPI  A 59-year-old man has been experiencing bilateral great toe pain for the past week. The toes are red, swollen, and painful. He has been taking Ibuprofen without relief. He has a history of gout. He rates the pain at a severity of 10/10.      Medications:     Current Outpatient Medications   Medication Sig Dispense Refill    predniSONE 20 MG Oral Tab Take 1 tablet (20 mg total) by mouth daily for 5 days. 5 tablet 0    colchicine 0.6 MG Oral Tab Take 1 tablet (0.6 mg total) by mouth daily for 7 days. 7 tablet 0    indomethacin 50 MG Oral Cap Take 1 capsule (50 mg total) by mouth 3 (three) times daily with meals. 30 capsule 0    alprazolam 2 MG Oral Tab Take 1 tablet (2 mg total) by mouth nightly as needed. 30 tablet 0    Zaleplon 10 MG Oral Cap Take 1 capsule (10 mg total) by mouth at bedtime. 30 capsule 5    Zolpidem Tartrate ER 12.5 MG Oral Tab CR Take 1 tablet (12.5 mg total) by mouth nightly as needed. 30 tablet 1    Zolpidem Tartrate ER 6.25 MG Oral Tab CR Take 1 tablet (6.25 mg total) by mouth nightly as needed for Sleep. 90 tablet 0    Na Sulfate-K Sulfate-Mg Sulf (SUPREP BOWEL PREP KIT) 17.5-3.13-1.6 GM/177ML Oral Solution Take as directed 1 each 0    amLODIPine 10 MG Oral Tab Take 1 tablet (10 mg total) by mouth daily. 90 tablet 3    simvastatin 20 MG Oral Tab Take 1 tablet (20 mg total) by mouth nightly. 90 tablet 3    Lancets (ONETOUCH DELICA PLUS [id number]) Does not apply Misc 1 strip by In Vitro route 2 (two) times daily. 200 each 0    Glucose Blood (ONETOUCH VERIO) In Vitro Strip Test 2x daily 200 strip 0    Blood Glucose Monitoring Suppl (ONETOUCH VERIO) w/Device Does not apply Kit 1 each daily. 1 kit 0    FLUoxetine 10 MG Oral Cap TAKE 1 CAPSULE BY MOUTH DAILY FOR 1 WEEK THEN INCREASE TO 2 CAPSULES BY MOUTH DAILY AT THE BEGINNING OF WEEK TWO. 60 capsule 0    Ergocalciferol (VITAMIN D CAPS 99711 IU OR) Take by mouth.      Eszopiclone 2 MG Oral Tab Take 1 tablet (2 mg total) by mouth  nightly. 30 tablet 0       Allergies:   Allergies[1]    History:     Health Maintenance   Topic Date Due    Diabetes Care Dilated Eye Exam  Never done    Pneumococcal Vaccine: 50+ Years (1 of 2 - PCV) Never done    Zoster Vaccines (1 of 2) Never done    DTaP,Tdap,and Td Vaccines (2 - Td or Tdap) 11/07/2021    Influenza Vaccine (1) Never done    Colorectal Cancer Screening  10/26/2024    Annual Depression Screening  01/01/2025    Diabetes Care: Foot Exam (Annual)  01/01/2025    Diabetes Care: Microalb/Creat Ratio (Annual)  01/01/2025    PSA  02/20/2025    Diabetes Care A1C  03/10/2025    Diabetes Care: GFR  08/05/2025    Annual Physical  09/10/2025    Meningococcal B Vaccine  Aged Out    COVID-19 Vaccine  Discontinued       No LMP for male patient.   Past Medical History:     Past Medical History:    Diabetes (HCC)    Essential hypertension    High blood pressure       Past Surgical History:     Past Surgical History:   Procedure Laterality Date    Colonoscopy N/A 3/20/2017    Procedure: COLONOSCOPY;  Surgeon: John Jernigan MD;  Location: [facility] ENDOSCOPY    Colonoscopy N/A 10/26/2021    Procedure: COLONOSCOPY;  Surgeon: John Jernigan MD;  Location: [facility] ENDOSCOPY    Elbow surgery Right 2010    right elbow ligament torn     Foot surgery Left 1999    archilles tendon torn    Hernia surgery         Family History:     Family History   Problem Relation Age of Onset    Diabetes Mother        Social History:     Social History     Socioeconomic History    Marital status: Single     Spouse name: Not on file    Number of children: Not on file    Years of education: Not on file    Highest education level: Not on file   Occupational History    Not on file   Tobacco Use    Smoking status: Former     Current packs/day: 0.00     Types: Cigars, Cigarettes    Smokeless tobacco: Never    Tobacco comments:     2-3 cigars daily   Vaping Use    Vaping status: Never Used   Substance and Sexual Activity    Alcohol  use: Yes     Alcohol/week: 0.0 standard drinks of alcohol     Comment: 2 beers a day and possibly scotch    Drug use: No    Sexual activity: Not on file   Other Topics Concern    Not on file   Social History Narrative    Not on file     Social Drivers of Health     Food Insecurity: Not on file   Transportation Needs: Not on file   Stress: Not on file   Housing Stability: Not on file       Review of Systems:   Review of Systems   + bilateral great toe pain    Vitals:    02/25/25 1238 02/25/25 1249   BP: 156/84 136/82   Pulse: 63    Weight: 201 lb (91.2 kg)    Height: 5' 11\" (1.803 m)      Body mass index is 28.03 kg/m².    Physical Exam:   Physical Exam  Vitals reviewed.      Left great toe: + mild erythema, swollen and tenderness to touch.  Right ankle : + mild erythema, swollen, and tenderness to touch.    Assessment and Plan::     Assessment & Plan  Acute idiopathic gout involving toe of left foot    Orders:    predniSONE 20 MG Oral Tab; Take 1 tablet (20 mg total) by mouth daily for 5 days.    colchicine 0.6 MG Oral Tab; Take 1 tablet (0.6 mg total) by mouth daily for 7 days.       Discussed plan of care with pt and pt is in agreement.All questions answered. Pt to call with questions or concerns.         [1] No Known Allergies

## 2025-02-25 NOTE — ASSESSMENT & PLAN NOTE
Orders:    predniSONE 20 MG Oral Tab; Take 1 tablet (20 mg total) by mouth daily for 5 days.    colchicine 0.6 MG Oral Tab; Take 1 tablet (0.6 mg total) by mouth daily for 7 days.

## 2025-02-25 NOTE — TELEPHONE ENCOUNTER
Action Requested: Summary for Provider     []  Critical Lab, Recommendations Needed  [] Need Additional Advice  []   FYI    []   Need Orders  [] Need Medications Sent to Pharmacy  []  Other     SUMMARY: Patient states swelling in severe swelling in feet that has been occurring for about a week now becoming difficult to walk. Appointment scheduled    Reason for call: Swelling Edema  Onset: A week ago    Complaining of severe bilateral pedal edema. Pain in right foot and ankle and left big toe. Swelling is making it difficult to walk. Denies fever, chest pain, difficulty breathing or calf pain. Patient states he believes this is gout flare up.                    Reason for Disposition   SEVERE swelling (e.g., can't move swollen ankle at all)    Protocols used: Ankle Swelling-A-OH

## 2025-02-25 NOTE — TELEPHONE ENCOUNTER
Gamaliel FAJARDO Em Rn Triage (supporting Medhat Fontana, DO)8 hours ago (11:06 PM)       I've been experiencing a flare-up in both of my feet--my left big toe and right ankle--for the past week, and it's making it very difficult to walk. Is it possible for me to come in, or could one of the nurses give me a call as soon as possible?

## 2025-02-27 RX ORDER — ZOLPIDEM TARTRATE 6.25 MG/1
6.25 TABLET, FILM COATED, EXTENDED RELEASE ORAL NIGHTLY PRN
Qty: 90 TABLET | Refills: 0 | Status: SHIPPED | OUTPATIENT
Start: 2025-02-27

## 2025-02-27 NOTE — TELEPHONE ENCOUNTER
01/26/2025 12.5 mg Quantity: 30 LAST WRITTEN: 12/19/2024 12/19/2024 12.5 mg Quantity: 30 LAST WRITTEN: 12/19/2024  10/18/2024 6.25 mg Quantity: 30 LAST WRITTEN: 10/17/2024      Recent Visits  Date Type Provider Dept   10/17/24 Office Visit Medhat Fontana,  [facility]     Requested Prescriptions     Pending Prescriptions Disp Refills    Zolpidem Tartrate ER 6.25 MG Oral Tab CR 90 tablet 0     Sig: Take 1 tablet (6.25 mg total) by mouth nightly as needed for Sleep.

## 2025-03-27 ENCOUNTER — TELEPHONE (OUTPATIENT)
Dept: FAMILY MEDICINE CLINIC | Facility: CLINIC | Age: 60
End: 2025-03-27

## 2025-03-28 ENCOUNTER — NURSE TRIAGE (OUTPATIENT)
Dept: FAMILY MEDICINE CLINIC | Facility: CLINIC | Age: 60
End: 2025-03-28

## 2025-03-28 ENCOUNTER — HOSPITAL ENCOUNTER (OUTPATIENT)
Dept: GENERAL RADIOLOGY | Age: 60
Discharge: HOME | End: 2025-03-28
Attending: PHYSICIAN ASSISTANT

## 2025-03-28 ENCOUNTER — LAB ENCOUNTER (OUTPATIENT)
Dept: LAB | Age: 60
End: 2025-03-28
Attending: PHYSICIAN ASSISTANT

## 2025-03-28 DIAGNOSIS — M10.072 ACUTE IDIOPATHIC GOUT INVOLVING TOE OF LEFT FOOT: ICD-10-CM

## 2025-03-28 LAB
ANION GAP SERPL CALC-SCNC: 7 MMOL/L (ref 0–18)
BASOPHILS # BLD AUTO: 0.03 X10(3) UL (ref 0–0.2)
BASOPHILS NFR BLD AUTO: 0.6 %
BUN BLD-MCNC: 11 MG/DL (ref 9–23)
BUN/CREAT SERPL: 9.6 (ref 10–20)
CALCIUM BLD-MCNC: 9.4 MG/DL (ref 8.7–10.4)
CHLORIDE SERPL-SCNC: 103 MMOL/L (ref 98–112)
CO2 SERPL-SCNC: 31 MMOL/L (ref 21–32)
CREAT BLD-MCNC: 1.14 MG/DL
DEPRECATED RDW RBC AUTO: 42.7 FL (ref 35.1–46.3)
EGFRCR SERPLBLD CKD-EPI 2021: 74 ML/MIN/1.73M2 (ref 60–?)
EOSINOPHIL # BLD AUTO: 0.15 X10(3) UL (ref 0–0.7)
EOSINOPHIL NFR BLD AUTO: 3 %
ERYTHROCYTE [DISTWIDTH] IN BLOOD BY AUTOMATED COUNT: 13.4 % (ref 11–15)
FASTING STATUS PATIENT QL REPORTED: YES
GLUCOSE BLD-MCNC: 120 MG/DL (ref 70–99)
HCT VFR BLD AUTO: 47.6 %
HGB BLD-MCNC: 16.3 G/DL
IMM GRANULOCYTES # BLD AUTO: 0.01 X10(3) UL (ref 0–1)
IMM GRANULOCYTES NFR BLD: 0.2 %
LYMPHOCYTES # BLD AUTO: 2.01 X10(3) UL (ref 1–4)
LYMPHOCYTES NFR BLD AUTO: 39.6 %
MCH RBC QN AUTO: 29.7 PG (ref 26–34)
MCHC RBC AUTO-ENTMCNC: 34.2 G/DL (ref 31–37)
MCV RBC AUTO: 86.7 FL
MONOCYTES # BLD AUTO: 0.61 X10(3) UL (ref 0.1–1)
MONOCYTES NFR BLD AUTO: 12 %
NEUTROPHILS # BLD AUTO: 2.27 X10 (3) UL (ref 1.5–7.7)
NEUTROPHILS # BLD AUTO: 2.27 X10(3) UL (ref 1.5–7.7)
NEUTROPHILS NFR BLD AUTO: 44.6 %
OSMOLALITY SERPL CALC.SUM OF ELEC: 293 MOSM/KG (ref 275–295)
PLATELET # BLD AUTO: 123 10(3)UL (ref 150–450)
PLATELETS.RETICULATED NFR BLD AUTO: 4.7 % (ref 0–7)
POTASSIUM SERPL-SCNC: 4.2 MMOL/L (ref 3.5–5.1)
RBC # BLD AUTO: 5.49 X10(6)UL
SODIUM SERPL-SCNC: 141 MMOL/L (ref 136–145)
URATE SERPL-MCNC: 2.6 MG/DL
WBC # BLD AUTO: 5.1 X10(3) UL (ref 4–11)

## 2025-03-28 PROCEDURE — 84550 ASSAY OF BLOOD/URIC ACID: CPT

## 2025-03-28 PROCEDURE — 80048 BASIC METABOLIC PNL TOTAL CA: CPT

## 2025-03-28 PROCEDURE — 36415 COLL VENOUS BLD VENIPUNCTURE: CPT

## 2025-03-28 PROCEDURE — 73630 X-RAY EXAM OF FOOT: CPT | Performed by: PHYSICIAN ASSISTANT

## 2025-03-28 PROCEDURE — 85025 COMPLETE CBC W/AUTO DIFF WBC: CPT

## 2025-03-28 NOTE — TELEPHONE ENCOUNTER
Patient stated that he saw Desean Aaron on 2/25/2025 left big toe redness, swelling, and pain. Patient took the prednisone for 5 days and the colchicine for 7days, which did help. Has been taking the indomethacin 3 times daily but still having the left big toe pain, swelling, and redness. No other symptoms. No appointments with Desean Aaron today. Wanted to get providers recommendations. Please advise.

## 2025-04-07 NOTE — TELEPHONE ENCOUNTER
Message # [phone]         2025 05:01p   [[email or web address]]  To:  From:  BRAYDEN Green MD:  Phone#:  ----------------------------------------------------------------------  EYAD MENDOZA [phone]  65 AWIL[address] RIVAS NEEDS TO DISCUSS ONGOING GOUT FLARE UP Paged at number :  PAGE: [phone] at :  Mar- 17:01          (Message Delivered)   AMADOU E L I V E R I E S :  2025 05:01p           CRISTÓBAL Schaefer

## 2025-04-09 NOTE — TELEPHONE ENCOUNTER
Called patient immediately after being paged agree the patient needs to make an appointment ASAP to discuss his gout.  Patient verbalizes understanding

## 2025-04-30 DIAGNOSIS — F41.1 GAD (GENERALIZED ANXIETY DISORDER): ICD-10-CM

## 2025-04-30 DIAGNOSIS — F51.04 PSYCHOPHYSIOLOGICAL INSOMNIA: ICD-10-CM

## 2025-04-30 RX ORDER — ZOLPIDEM TARTRATE 6.25 MG/1
6.25 TABLET, FILM COATED, EXTENDED RELEASE ORAL NIGHTLY PRN
Qty: 90 TABLET | Refills: 0 | Status: CANCELLED | OUTPATIENT
Start: 2025-04-30

## 2025-05-01 DIAGNOSIS — F41.1 GAD (GENERALIZED ANXIETY DISORDER): ICD-10-CM

## 2025-05-02 RX ORDER — ALPRAZOLAM 2 MG/1
2 TABLET ORAL NIGHTLY PRN
Qty: 30 TABLET | Refills: 0 | OUTPATIENT
Start: 2025-05-02

## 2025-05-02 NOTE — TELEPHONE ENCOUNTER
Please review.  Protocol Failed.    Alprazolam 2mg Recent fills each # 30 : 12/4, 1/21, 2/18  Last prescription written: 2/18/25  Last office visit: 2/25/25 with VIKI Aaron  10/17/2024 with Dr. Fontana    No future appointments.      Requested Prescriptions   Pending Prescriptions Disp Refills    alprazolam 2 MG Oral Tab 30 tablet 0     Sig: Take 1 tablet (2 mg total) by mouth nightly as needed.       Controlled Substance Medication Failed - 5/2/2025  1:31 PM        Failed - This medication is a controlled substance - forward to provider to refill        Passed - Medication is active on med list

## 2025-05-03 RX ORDER — ALPRAZOLAM 2 MG/1
2 TABLET ORAL NIGHTLY PRN
Qty: 30 TABLET | Refills: 0 | Status: SHIPPED | OUTPATIENT
Start: 2025-05-03

## 2025-05-19 ENCOUNTER — HOSPITAL ENCOUNTER (OUTPATIENT)
Dept: GENERAL RADIOLOGY | Age: 60
Discharge: HOME | End: 2025-05-19
Attending: PHYSICIAN ASSISTANT
Payer: COMMERCIAL

## 2025-05-19 ENCOUNTER — OFFICE VISIT (OUTPATIENT)
Dept: FAMILY MEDICINE CLINIC | Facility: CLINIC | Age: 60
End: 2025-05-19

## 2025-05-19 ENCOUNTER — LAB ENCOUNTER (OUTPATIENT)
Dept: LAB | Age: 60
End: 2025-05-19
Attending: PHYSICIAN ASSISTANT
Payer: COMMERCIAL

## 2025-05-19 VITALS
DIASTOLIC BLOOD PRESSURE: 90 MMHG | SYSTOLIC BLOOD PRESSURE: 152 MMHG | HEIGHT: 71 IN | WEIGHT: 205 LBS | BODY MASS INDEX: 28.7 KG/M2 | HEART RATE: 88 BPM

## 2025-05-19 DIAGNOSIS — M79.89 SWELLING OF RIGHT HAND: ICD-10-CM

## 2025-05-19 DIAGNOSIS — M79.89 SWELLING OF RIGHT HAND: Primary | ICD-10-CM

## 2025-05-19 DIAGNOSIS — I10 PRIMARY HYPERTENSION: ICD-10-CM

## 2025-05-19 LAB
CRP SERPL-MCNC: 0.4 MG/DL (ref ?–1)
ERYTHROCYTE [SEDIMENTATION RATE] IN BLOOD: 16 MM/HR (ref 0–20)
RHEUMATOID FACT SERPL-ACNC: 7.7 IU/ML (ref ?–14)

## 2025-05-19 PROCEDURE — 86038 ANTINUCLEAR ANTIBODIES: CPT

## 2025-05-19 PROCEDURE — 36415 COLL VENOUS BLD VENIPUNCTURE: CPT | Performed by: PHYSICIAN ASSISTANT

## 2025-05-19 PROCEDURE — 85652 RBC SED RATE AUTOMATED: CPT | Performed by: PHYSICIAN ASSISTANT

## 2025-05-19 PROCEDURE — 99213 OFFICE O/P EST LOW 20 MIN: CPT | Performed by: PHYSICIAN ASSISTANT

## 2025-05-19 PROCEDURE — 86225 DNA ANTIBODY NATIVE: CPT

## 2025-05-19 PROCEDURE — 73130 X-RAY EXAM OF HAND: CPT | Performed by: PHYSICIAN ASSISTANT

## 2025-05-19 PROCEDURE — 86431 RHEUMATOID FACTOR QUANT: CPT | Performed by: PHYSICIAN ASSISTANT

## 2025-05-19 PROCEDURE — 86140 C-REACTIVE PROTEIN: CPT | Performed by: PHYSICIAN ASSISTANT

## 2025-05-19 NOTE — PROGRESS NOTES
HPI:     HPI  A 59-year-old man presents with weakness and a tingling sensation in his right hand, which he has experienced for the past few months. The tingling radiates to the three fingers on the right side. He also reports discomfort in the joints of his right hand and does not take any pain relief.      Medications:   Current Medications[1]    Allergies:   Allergies[2]    History:     Health Maintenance   Topic Date Due    Diabetes Care Dilated Eye Exam  Never done    Pneumococcal Vaccine: 50+ Years (1 of 2 - PCV) Never done    Zoster Vaccines (1 of 2) Never done    DTaP,Tdap,and Td Vaccines (2 - Td or Tdap) 11/07/2021    Colorectal Cancer Screening  10/26/2024    Annual Depression Screening  01/01/2025    Diabetes Care: Microalb/Creat Ratio (Annual)  01/01/2025    PSA  02/20/2025    Diabetes Care A1C  03/10/2025    Annual Physical  09/10/2025    Influenza Vaccine (Season Ended) 10/01/2025    Diabetes Care Foot Exam  10/17/2025    Diabetes Care: GFR  03/28/2026    Meningococcal B Vaccine  Aged Out    COVID-19 Vaccine  Discontinued       No LMP for male patient.   Past Medical History:   Past Medical History[3]    Past Surgical History:   Past Surgical History[4]    Family History:   Family History[5]    Social History:     Social History     Socioeconomic History    Marital status: Single     Spouse name: Not on file    Number of children: Not on file    Years of education: Not on file    Highest education level: Not on file   Occupational History    Not on file   Tobacco Use    Smoking status: Former     Current packs/day: 0.00     Types: Cigars, Cigarettes    Smokeless tobacco: Never    Tobacco comments:     2-3 cigars daily   Vaping Use    Vaping status: Never Used   Substance and Sexual Activity    Alcohol use: Yes     Alcohol/week: 0.0 standard drinks of alcohol     Comment: 2 beers a day and possibly scotch    Drug use: No    Sexual activity: Not on file   Other Topics Concern    Not on file   Social  History Narrative    Not on file     Social Drivers of Health     Food Insecurity: Not on file   Transportation Needs: Not on file   Stress: Not on file   Housing Stability: Not on file       Review of Systems:   Review of Systems   Neurological:  Positive for weakness.        Vitals:    05/19/25 1402 05/19/25 1431   BP: (!) 165/106 152/90   Pulse: 88    Weight: 205 lb (93 kg)    Height: 5' 11\" (1.803 m)      Body mass index is 28.59 kg/m².    Physical Exam:   Physical Exam  Vitals reviewed.      Right hand: + mild swelling, no erythema, no tenderness to palpation. 4/5 strength.    Assessment and Plan::     Assessment & Plan  Swelling of right hand    Orders:    C-Reactive Protein    Rheumatoid Arthritis Factor    Sed Rate, [facility] (Automated)    Connective Tissue Disease (CATHERINE) Screen; Future; Expected date: 05/19/2025    XR HAND BILAT (MIN 3 VIEWS) (CPT=73130-50); Future; Expected date: 05/19/2025    Physical Therapy Referral - [facility]    Primary hypertension  Advise the patient to monitor blood pressure at home. He states that his blood pressure was normal at work PE.          Discussed plan of care with pt and pt is in agreement.All questions answered. Pt to call with questions or concerns.         [1]   Current Outpatient Medications   Medication Sig Dispense Refill    alprazolam 2 MG Oral Tab Take 1 tablet (2 mg total) by mouth nightly as needed. 30 tablet 0    Zolpidem Tartrate ER 6.25 MG Oral Tab CR Take 1 tablet (6.25 mg total) by mouth nightly as needed for Sleep. 90 tablet 0    indomethacin 50 MG Oral Cap Take 1 capsule (50 mg total) by mouth 3 (three) times daily with meals. 30 capsule 0    Zaleplon 10 MG Oral Cap Take 1 capsule (10 mg total) by mouth at bedtime. 30 capsule 5    Zolpidem Tartrate ER 12.5 MG Oral Tab CR Take 1 tablet (12.5 mg total) by mouth nightly as needed. 30 tablet 1    Na Sulfate-K Sulfate-Mg Sulf (SUPREP BOWEL PREP KIT) 17.5-3.13-1.6 GM/177ML Oral Solution Take as  directed 1 each 0    amLODIPine 10 MG Oral Tab Take 1 tablet (10 mg total) by mouth daily. 90 tablet 3    simvastatin 20 MG Oral Tab Take 1 tablet (20 mg total) by mouth nightly. 90 tablet 3    Lancets (ONETOUCH DELICA PLUS [id number]) Does not apply Misc 1 strip by In Vitro route 2 (two) times daily. 200 each 0    Glucose Blood (ONETOUCH VERIO) In Vitro Strip Test 2x daily 200 strip 0    Blood Glucose Monitoring Suppl (ONETOUCH VERIO) w/Device Does not apply Kit 1 each daily. 1 kit 0    FLUoxetine 10 MG Oral Cap TAKE 1 CAPSULE BY MOUTH DAILY FOR 1 WEEK THEN INCREASE TO 2 CAPSULES BY MOUTH DAILY AT THE BEGINNING OF WEEK TWO. 60 capsule 0    Ergocalciferol (VITAMIN D CAPS 49029 IU OR) Take by mouth.      Eszopiclone 2 MG Oral Tab Take 1 tablet (2 mg total) by mouth nightly. 30 tablet 0   [2] No Known Allergies  [3]   Past Medical History:   Diabetes (HCC)    Essential hypertension    High blood pressure   [4]   Past Surgical History:  Procedure Laterality Date    Colonoscopy N/A 3/20/2017    Procedure: COLONOSCOPY;  Surgeon: John Jernigan MD;  Location: [facility] ENDOSCOPY    Colonoscopy N/A 10/26/2021    Procedure: COLONOSCOPY;  Surgeon: John Jernigan MD;  Location: [facility] ENDOSCOPY    Elbow surgery Right 2010    right elbow ligament torn     Foot surgery Left 1999    archilles tendon torn    Hernia surgery     [5]   Family History  Problem Relation Age of Onset    Diabetes Mother

## 2025-05-20 LAB
DSDNA IGG SERPL IA-ACNC: 1.4 IU/ML (ref ?–10)
ENA AB SER QL IA: 0.2 UG/L (ref ?–0.7)
ENA AB SER QL IA: NEGATIVE

## 2025-05-20 NOTE — ASSESSMENT & PLAN NOTE
Advise the patient to monitor blood pressure at home. He states that his blood pressure was normal at work PE.

## 2025-05-29 DIAGNOSIS — I10 ESSENTIAL HYPERTENSION WITH GOAL BLOOD PRESSURE LESS THAN 140/90: ICD-10-CM

## 2025-05-29 RX ORDER — AMLODIPINE BESYLATE 10 MG/1
10 TABLET ORAL DAILY
Qty: 90 TABLET | Refills: 3 | Status: SHIPPED | OUTPATIENT
Start: 2025-05-29

## 2025-05-29 NOTE — TELEPHONE ENCOUNTER
Please review. Protocol Failed; No Protocol    Future Appointments   Date Time Provider Department Center   8/28/2025 11:20 AM Medhat Fontana, DO [facility]

## 2025-06-09 DIAGNOSIS — F41.1 GAD (GENERALIZED ANXIETY DISORDER): ICD-10-CM

## 2025-06-10 RX ORDER — ALPRAZOLAM 2 MG/1
2 TABLET ORAL NIGHTLY PRN
Qty: 30 TABLET | Refills: 0 | Status: SHIPPED | OUTPATIENT
Start: 2025-06-10

## 2025-06-10 NOTE — TELEPHONE ENCOUNTER
Please review.  Protocol failed/has no protocol    Recent fills each # 30 : 05/03/2025,02/18/2025  Last prescription written: 05/03/2025  Last office visit:  10/17/2024    Future Appointments   Date Time Provider Department Center   8/28/2025 11:20 AM [facility]

## 2025-06-20 DIAGNOSIS — F51.04 PSYCHOPHYSIOLOGICAL INSOMNIA: ICD-10-CM

## 2025-06-23 NOTE — TELEPHONE ENCOUNTER
Discontinuing all zolpidem ER 6.25 mg prescriptions.    Per 12/10/24 refill request:  Dr Fontana decreased the dose of ambien from 12.5mg to 6.25mg to see how patient would do on the lower dose. Patient stated that he wants to go back to the 12.5mg zolpidem, that the 6.25mg is not working for him -not strong enough. Please advise.

## 2025-06-26 ENCOUNTER — NURSE TRIAGE (OUTPATIENT)
Dept: FAMILY MEDICINE CLINIC | Facility: CLINIC | Age: 60
End: 2025-06-26

## 2025-06-26 NOTE — TELEPHONE ENCOUNTER
Please reply to pool: EM RN TRIAGE  Action Requested: Summary for Provider     []  Critical Lab, Recommendations Needed  [] Need Additional Advice  [x]   FYI    []   Need Orders  [] Need Medications Sent to Pharmacy  []  Other     SUMMARY: Patient contacts clinic reporting ongoing weakness and tingling of right hand.  Seen in office 05/19 and problem persists.  He has difficulty grasping and lifting his arm.  Some neck pain.  Denies facial drooping or tingling.  Lower extremity is not affected.  Denies chest pain, shortness of breath, dizziness, lightheadedness or headache. He does report separate instances of mild headache and dizziness, no symptoms now.   Acute visit booked Monday 06/30.  Patient advised to monitor symptoms.  If dizziness, headache or worsening tingling or weakness occur to report to emergency room. He verbalized understanding and compliance.     Please not this triage assessment occurred over 2 separate phone calls.     Reason for call: Numbness Weakness  Onset: Data Unavailable                       Reason for Disposition   Weakness of arm or leg is a chronic symptom (recurrent or ongoing problem lasting > 4 weeks)    Protocols used: Neurologic Deficit-A-OH

## 2025-06-27 NOTE — TELEPHONE ENCOUNTER
Please review; protocol failed/ has no protocol      Please advise patient has been dispensing Zolpidem 6.25 mg sent back in 02/27/2025, recently dispensed  Zolpidem 6.25 mg 90 day supply, however patient is requesting refill for Zolpidem 12.5 mg.    Contacted patient and verified why patient is asking for 12.5 mg when picked up 6.25 recently said was doubling dose of 6.25 mg to equal 12.5 mg only prescription on file .      Pended Zolpidem 12.5 mg for review as patient is requesting only strength that helps needs new prescription as he picked up recently for 6.25 mg but doubling dose      Fill dates for Zolpidem 12.5 mg     Recent fills: 01/26/2025  Last Rx written: 12/19/2024  Last Office Visit: 10/17/2024    Recent Visits  Date Type Provider Dept   10/17/24 Office Visit Medhat Fontana, DO [facility]     Future Appointments  Date Type Provider Dept   08/28/25 Appointment Medhat Fontana DO [facility]     Fill dates for Zolpidem 6.25 mg     Recent fills: 05/05/2025  Last Rx written: 02/27/2025  Last Office Visit: 10/17/2024    Recent Visits  Date Type Provider Dept   10/17/24 Office Visit Medhat Fontana, DO [facility]     Future Appointments  Date Type Provider Dept   08/28/25 Appointment Medhat Fontana DO [facility]

## 2025-06-28 RX ORDER — ZOLPIDEM TARTRATE 12.5 MG/1
12.5 TABLET, FILM COATED, EXTENDED RELEASE ORAL NIGHTLY PRN
Qty: 30 TABLET | Refills: 0 | Status: SHIPPED | OUTPATIENT
Start: 2025-06-28

## 2025-06-30 ENCOUNTER — LAB ENCOUNTER (OUTPATIENT)
Dept: LAB | Age: 60
End: 2025-06-30
Payer: COMMERCIAL

## 2025-06-30 ENCOUNTER — OFFICE VISIT (OUTPATIENT)
Dept: FAMILY MEDICINE CLINIC | Facility: CLINIC | Age: 60
End: 2025-06-30

## 2025-06-30 VITALS
HEART RATE: 80 BPM | WEIGHT: 201 LBS | HEIGHT: 71 IN | DIASTOLIC BLOOD PRESSURE: 77 MMHG | OXYGEN SATURATION: 97 % | BODY MASS INDEX: 28.14 KG/M2 | SYSTOLIC BLOOD PRESSURE: 119 MMHG

## 2025-06-30 DIAGNOSIS — R20.0 NUMBNESS AND TINGLING IN RIGHT HAND: Primary | ICD-10-CM

## 2025-06-30 DIAGNOSIS — R20.2 NUMBNESS AND TINGLING IN RIGHT HAND: Primary | ICD-10-CM

## 2025-06-30 DIAGNOSIS — E55.9 VITAMIN D DEFICIENCY: ICD-10-CM

## 2025-06-30 DIAGNOSIS — F41.1 GAD (GENERALIZED ANXIETY DISORDER): ICD-10-CM

## 2025-06-30 DIAGNOSIS — F41.0 PANIC ATTACK: ICD-10-CM

## 2025-06-30 LAB
ALBUMIN SERPL-MCNC: 4.8 G/DL (ref 3.2–4.8)
ALBUMIN/GLOB SERPL: 1.6 {RATIO} (ref 1–2)
ALP LIVER SERPL-CCNC: 64 U/L (ref 45–117)
ALT SERPL-CCNC: 58 U/L (ref 10–49)
ANION GAP SERPL CALC-SCNC: 10 MMOL/L (ref 0–18)
AST SERPL-CCNC: 51 U/L (ref ?–34)
BASOPHILS # BLD AUTO: 0.03 X10(3) UL (ref 0–0.2)
BASOPHILS NFR BLD AUTO: 0.7 %
BILIRUB SERPL-MCNC: 1.1 MG/DL (ref 0.3–1.2)
BUN BLD-MCNC: 10 MG/DL (ref 9–23)
BUN/CREAT SERPL: 7.9 (ref 10–20)
CALCIUM BLD-MCNC: 9.7 MG/DL (ref 8.7–10.4)
CHLORIDE SERPL-SCNC: 101 MMOL/L (ref 98–112)
CO2 SERPL-SCNC: 27 MMOL/L (ref 21–32)
CREAT BLD-MCNC: 1.26 MG/DL (ref 0.7–1.3)
DEPRECATED HBV CORE AB SER IA-ACNC: 747 NG/ML (ref 50–336)
DEPRECATED RDW RBC AUTO: 43.8 FL (ref 35.1–46.3)
EGFRCR SERPLBLD CKD-EPI 2021: 66 ML/MIN/1.73M2 (ref 60–?)
EOSINOPHIL # BLD AUTO: 0.05 X10(3) UL (ref 0–0.7)
EOSINOPHIL NFR BLD AUTO: 1.1 %
ERYTHROCYTE [DISTWIDTH] IN BLOOD BY AUTOMATED COUNT: 13.3 % (ref 11–15)
FASTING STATUS PATIENT QL REPORTED: YES
GLOBULIN PLAS-MCNC: 3 G/DL (ref 2–3.5)
GLUCOSE BLD-MCNC: 150 MG/DL (ref 70–99)
HCT VFR BLD AUTO: 52.4 % (ref 39–53)
HGB BLD-MCNC: 17.9 G/DL (ref 13–17.5)
IMM GRANULOCYTES # BLD AUTO: 0 X10(3) UL (ref 0–1)
IMM GRANULOCYTES NFR BLD: 0 %
IRON SATN MFR SERPL: 40 % (ref 20–50)
IRON SERPL-MCNC: 127 UG/DL (ref 65–175)
LYMPHOCYTES # BLD AUTO: 1.57 X10(3) UL (ref 1–4)
LYMPHOCYTES NFR BLD AUTO: 35.5 %
MCH RBC QN AUTO: 30.2 PG (ref 26–34)
MCHC RBC AUTO-ENTMCNC: 34.2 G/DL (ref 31–37)
MCV RBC AUTO: 88.5 FL (ref 80–100)
MONOCYTES # BLD AUTO: 0.6 X10(3) UL (ref 0.1–1)
MONOCYTES NFR BLD AUTO: 13.6 %
NEUTROPHILS # BLD AUTO: 2.17 X10 (3) UL (ref 1.5–7.7)
NEUTROPHILS # BLD AUTO: 2.17 X10(3) UL (ref 1.5–7.7)
NEUTROPHILS NFR BLD AUTO: 49.1 %
OSMOLALITY SERPL CALC.SUM OF ELEC: 288 MOSM/KG (ref 275–295)
PLATELET # BLD AUTO: 128 10(3)UL (ref 150–450)
PLATELETS.RETICULATED NFR BLD AUTO: 3.2 % (ref 0–7)
POTASSIUM SERPL-SCNC: 3.3 MMOL/L (ref 3.5–5.1)
PROT SERPL-MCNC: 7.8 G/DL (ref 5.7–8.2)
RBC # BLD AUTO: 5.92 X10(6)UL (ref 4.3–5.7)
SODIUM SERPL-SCNC: 138 MMOL/L (ref 136–145)
TOTAL IRON BINDING CAPACITY: 314 UG/DL (ref 250–425)
TRANSFERRIN SERPL-MCNC: 260 MG/DL (ref 215–365)
VIT B12 SERPL-MCNC: 342 PG/ML (ref 211–911)
VIT D+METAB SERPL-MCNC: 18.4 NG/ML (ref 30–100)
WBC # BLD AUTO: 4.4 X10(3) UL (ref 4–11)

## 2025-06-30 PROCEDURE — 83540 ASSAY OF IRON: CPT

## 2025-06-30 PROCEDURE — 82306 VITAMIN D 25 HYDROXY: CPT

## 2025-06-30 PROCEDURE — 36415 COLL VENOUS BLD VENIPUNCTURE: CPT

## 2025-06-30 PROCEDURE — 82728 ASSAY OF FERRITIN: CPT

## 2025-06-30 PROCEDURE — 82607 VITAMIN B-12: CPT

## 2025-06-30 PROCEDURE — 99214 OFFICE O/P EST MOD 30 MIN: CPT

## 2025-06-30 PROCEDURE — 85025 COMPLETE CBC W/AUTO DIFF WBC: CPT

## 2025-06-30 PROCEDURE — 84466 ASSAY OF TRANSFERRIN: CPT

## 2025-06-30 PROCEDURE — 80053 COMPREHEN METABOLIC PANEL: CPT

## 2025-06-30 RX ORDER — ESCITALOPRAM OXALATE 10 MG/1
10 TABLET ORAL DAILY
Qty: 30 TABLET | Refills: 0 | Status: SHIPPED | OUTPATIENT
Start: 2025-06-30

## 2025-06-30 NOTE — ASSESSMENT & PLAN NOTE
-Advised patient to take medication as directed and follow up with psychologist/psychiatrist/counselor  -Education provided on daily exercise, and proper sleep  -Discussed about decreasing the amount of caffeine daily and cut out alcohol  -Patient can reach out to family, friends, Denominational groups for support as needed  -Education material was printed and given to patient       Orders:    escitalopram (LEXAPRO) 10 MG Oral Tab; Take 1 tablet (10 mg total) by mouth daily.    [facility] - INTERNAL    OP REFERRAL TO [facility]

## 2025-06-30 NOTE — PROGRESS NOTES
Subjective:   Gamaliel Maldonado is a 59 year old male who presents for Hand Pain (Pt states he has been losing mobility on right arm and hand going on for 3 month. ).     HPI   Patient presents for ongoing weakness and tingling of his right hand. He was seen last month of the same issue and his symptoms are not getting better. Patient states that he is having difficulty with grasping items and lifting his arm. Patient denies fever, fatigue, cough, chest pain, palpitations, SOB, dizziness, lightheadedness, headaches, abdominal pain, diarrhea, nausea or vomiting    Patient presents with complaints of anxious mood. The patient reports worsening symptoms over the past few weeks. He having stressors at both work and at home. Patient reports feeling nervous, anxious or on edge, worrying too much about different things, trouble relaxing, and feeling afraid as if something awful might happen and excessive worry, inability to control worry, sweating, shortness of breath, dizziness, paresthesias, muscle tension, insomnia, easily fatigued, difficulty concentrating, and headaches. Patient denies palpitations, psychomotor agitation, obsessions, compulsions, and social fear. He reports no suicidal or homicidal ideations. Patient reports having a hard time coping up with daily activities at home and work.     History/Other:      Chief Complaint Reviewed and Verified  Nursing Notes Reviewed and   Verified  Tobacco Reviewed  Allergies Reviewed  Medications Reviewed    Problem List Reviewed  Medical History Reviewed  Surgical History   Reviewed  Family History Reviewed  Social History Reviewed           Tobacco:  He smoked tobacco in the past but quit greater than 12 months ago.  Tobacco Use[1]       Current Medications[2]    Allergies[3]      Review of Systems   Constitutional: Negative.  Negative for activity change and fatigue.   HENT: Negative.  Negative for congestion, ear pain, rhinorrhea and sneezing.    Eyes: Negative.   Negative for redness.   Respiratory: Negative.  Negative for cough, shortness of breath and wheezing.    Cardiovascular: Negative.  Negative for chest pain.   Gastrointestinal: Negative.  Negative for abdominal pain, constipation, diarrhea, nausea and vomiting.   Endocrine: Negative.    Genitourinary: Negative.  Negative for difficulty urinating and frequency.   Musculoskeletal: Negative.  Negative for back pain, joint swelling and myalgias.   Skin: Negative.  Negative for rash.   Allergic/Immunologic: Negative.    Neurological:  Positive for numbness. Negative for dizziness, syncope, light-headedness and headaches.   Hematological: Negative.    Psychiatric/Behavioral:  The patient is nervous/anxious.          Objective:   /77 (BP Location: Left arm, Patient Position: Sitting, Cuff Size: adult)   Pulse 80   Ht 5' 11\" (1.803 m)   Wt 201 lb (91.2 kg)   SpO2 97%   BMI 28.03 kg/m²  Estimated body mass index is 28.03 kg/m² as calculated from the following:    Height as of this encounter: 5' 11\" (1.803 m).    Weight as of this encounter: 201 lb (91.2 kg).      Physical Exam  Vitals and nursing note reviewed.   Constitutional:       Appearance: Normal appearance. He is normal weight.   HENT:      Head: Normocephalic and atraumatic.      Right Ear: Tympanic membrane normal.      Left Ear: Tympanic membrane normal.      Nose: Nose normal.      Mouth/Throat:      Mouth: Mucous membranes are moist.      Pharynx: Oropharynx is clear.   Eyes:      Extraocular Movements: Extraocular movements intact.      Conjunctiva/sclera: Conjunctivae normal.      Pupils: Pupils are equal, round, and reactive to light.   Cardiovascular:      Rate and Rhythm: Normal rate and regular rhythm.      Pulses: Normal pulses.      Heart sounds: Normal heart sounds.   Pulmonary:      Effort: Pulmonary effort is normal.      Breath sounds: Normal breath sounds.   Abdominal:      General: Abdomen is flat. Bowel sounds are normal.       Palpations: Abdomen is soft.   Musculoskeletal:         General: Normal range of motion.      Cervical back: Normal range of motion and neck supple.   Skin:     General: Skin is warm and dry.   Neurological:      General: No focal deficit present.      Mental Status: He is alert and oriented to person, place, and time. Mental status is at baseline.   Psychiatric:         Mood and Affect: Mood normal.         Behavior: Behavior normal.         Thought Content: Thought content normal.         Judgment: Judgment normal.         Assessment & Plan:     Assessment & Plan  Numbness and tingling in right hand  -Will order labs  Orders:    Comp Metabolic Panel (14)    CBC With Differential With Platelet    Iron And Tibc    Ferritin    Vitamin B12    Vitamin D deficiency    Orders:    Vitamin D; Future    [place] (generalized anxiety disorder)  -Advised patient to take medication as directed and follow up with psychologist/psychiatrist/counselor  -Education provided on daily exercise, and proper sleep  -Discussed about decreasing the amount of caffeine daily and cut out alcohol  -Patient can reach out to family, friends, [employer] groups for support as needed  -Education material was printed and given to patient       Orders:    escitalopram (LEXAPRO) 10 MG Oral Tab; Take 1 tablet (10 mg total) by mouth daily.    [facility] - INTERNAL    OP REFERRAL TO [facility]    Panic attack    Orders:    escitalopram (LEXAPRO) 10 MG Oral Tab; Take 1 tablet (10 mg total) by mouth daily.    [facility] - INTERNAL    OP REFERRAL TO [facility]      Medication use, effects and side effects discussed in detail with patient. The patient indicated understanding of the diagnosis and agreed with the plan of care.    Return in about 4 weeks (around 7/28/2025).    LISET Espinal         [1]   Social History  Tobacco Use   Smoking Status Former    Current packs/day: 0.00    Types: Cigars, Cigarettes   Smokeless Tobacco  Never   Tobacco Comments    2-3 cigars daily   [2]   Current Outpatient Medications   Medication Sig Dispense Refill    escitalopram (LEXAPRO) 10 MG Oral Tab Take 1 tablet (10 mg total) by mouth daily. 30 tablet 0    Zolpidem Tartrate ER 12.5 MG Oral Tab CR Take 1 tablet (12.5 mg total) by mouth nightly as needed. 30 tablet 0    alprazolam 2 MG Oral Tab Take 1 tablet (2 mg total) by mouth nightly as needed. 30 tablet 0    amLODIPine 10 MG Oral Tab Take 1 tablet (10 mg total) by mouth daily. 90 tablet 3    Na Sulfate-K Sulfate-Mg Sulf (SUPREP BOWEL PREP KIT) 17.5-3.13-1.6 GM/177ML Oral Solution Take as directed 1 each 0    simvastatin 20 MG Oral Tab Take 1 tablet (20 mg total) by mouth nightly. 90 tablet 3    Lancets (ONETOUCH DELICA PLUS SMRTUH81H) Does not apply Misc 1 strip by In Vitro route 2 (two) times daily. 200 each 0    Glucose Blood (ONETOUCH VERIO) In Vitro Strip Test 2x daily 200 strip 0    Blood Glucose Monitoring Suppl (ONETOUCH VERIO) w/Device Does not apply Kit 1 each daily. 1 kit 0    indomethacin 50 MG Oral Cap Take 1 capsule (50 mg total) by mouth 3 (three) times daily with meals. (Patient not taking: Reported on 6/30/2025) 30 capsule 0    Zaleplon 10 MG Oral Cap Take 1 capsule (10 mg total) by mouth at bedtime. (Patient not taking: Reported on 6/30/2025) 30 capsule 5    Ergocalciferol (VITAMIN D CAPS 84147 IU OR) Take by mouth. (Patient not taking: Reported on 6/30/2025)      Eszopiclone 2 MG Oral Tab Take 1 tablet (2 mg total) by mouth nightly. 30 tablet 0   [3] No Known Allergies

## 2025-07-03 ENCOUNTER — ORDER TRANSCRIPTION (OUTPATIENT)
Dept: PHYSICAL THERAPY | Facility: HOSPITAL | Age: 60
End: 2025-07-03

## 2025-07-03 DIAGNOSIS — M79.89 SWELLING OF RIGHT HAND: Primary | ICD-10-CM

## 2025-07-21 ENCOUNTER — TELEPHONE (OUTPATIENT)
Dept: PHYSICAL THERAPY | Facility: HOSPITAL | Age: 60
End: 2025-07-21

## 2025-07-22 ENCOUNTER — APPOINTMENT (OUTPATIENT)
Dept: PHYSICAL THERAPY | Age: 60
End: 2025-07-22
Attending: PHYSICIAN ASSISTANT
Payer: MEDICAID

## 2025-07-22 DIAGNOSIS — F41.0 PANIC ATTACK: ICD-10-CM

## 2025-07-22 DIAGNOSIS — F41.1 GAD (GENERALIZED ANXIETY DISORDER): ICD-10-CM

## 2025-07-23 DIAGNOSIS — F51.04 PSYCHOPHYSIOLOGICAL INSOMNIA: ICD-10-CM

## 2025-07-23 DIAGNOSIS — F41.1 GAD (GENERALIZED ANXIETY DISORDER): ICD-10-CM

## 2025-07-24 ENCOUNTER — OFFICE VISIT (OUTPATIENT)
Dept: PHYSICAL THERAPY | Age: 60
End: 2025-07-24
Attending: PHYSICIAN ASSISTANT
Payer: MEDICAID

## 2025-07-24 ENCOUNTER — TELEPHONE (OUTPATIENT)
Dept: FAMILY MEDICINE CLINIC | Facility: CLINIC | Age: 60
End: 2025-07-24

## 2025-07-24 PROCEDURE — 97165 OT EVAL LOW COMPLEX 30 MIN: CPT

## 2025-07-24 PROCEDURE — 97110 THERAPEUTIC EXERCISES: CPT

## 2025-07-24 NOTE — TELEPHONE ENCOUNTER
Patient calling ( name and date of birth of patient verified )  just left OT and was suggested to see Ortho  for his hand , right hand is worse , limited mobility   , OT feels there  may be nerve involvement with neck, shoulder       Patient would like to speak with Dr Fontana if possible    Best call back number: Gamaliel  at [phone]

## 2025-07-24 NOTE — PROGRESS NOTES
OT UE EVALUATION:     Diagnosis:   Swelling of right hand (M79.89) Patient:  Gamaliel Maldonado (59 year old, male)        Referring Provider: Desean Aaron  Today's Date   7/24/2025    Precautions:      Date of Evaluation: 07/24/25  Next MD visit: No data recorded  Date of Injury: No data recorded  Date of Surgery: No data recorded     PATIENT SUMMARY     Summary of chief complaints: Decreased function, decreased strength, increased edema.  History of current condition: Patient presents for ongoing weakness and swelling of his right hand. He was seen by MD in May but his symptoms are not getting better. Patient states that he is having difficulty with grasping items and lifting his arm.Blood work was taken indicating arthritis. Pt reports having muscle spasms and atrophy in arm.   Pain level: current 0 /10, at best 0 /10, at worst 6 /10  Description of symptoms:     Occupation: Unemployed   Occupational Roles:     Leisure activities/Hobbies: Basketball   Prior level of function: Independent  Current limitations: Gripping, carrying/lifting, working out, writing, bathing  Pt goals: Return to PLOF  Hand Dominance: right  Living Situation: other    Past medical history was reviewed with Gamaliel.  Significant findings include:    Imaging/Tests: X-ray   Gamaliel  has a past medical history of Diabetes (HCC), Essential hypertension, and High blood pressure.  He  has a past surgical history that includes hernia surgery; foot surgery (Left, 1999); elbow surgery (Right, 2010); colonoscopy (N/A, 3/20/2017); and colonoscopy (N/A, 10/26/2021).    ASSESSMENT  Gamaliel presents to occupational therapy evaluation with primary c/o Decreased function, decreased strength, increased edema.. The results of the objective tests and measures show Decreased ROM, decreased strength, increased edema. Functional deficits include but are not limited to Gripping, carrying/lifting, working out, writing, bathing. Patient evaluated for swelling of  right hand with symptoms ongoing since May. Reports include increased muscle spasms throughout the right upper extremity, visible atrophy, decreased  strength, and edema. Patient reports prior history of chiropractic treatment for the cervical spine and suspects current symptoms may be related to cervical involvement. Presentation is consistent with possible cervical radiculopathy, potentially affecting the C5-C6 nerve roots, which may explain weakness, muscular atrophy, and reduced hand function.Patient advised to seek orthopedic or neurologic consult for further evaluation of cervical spine pathology. Occupational therapy will focus on edema management, gentle neuromuscular re-education, and compensatory strategies as tolerated. Given patient’s symptoms, further diagnostic imaging and EMG may be warranted to guide comprehensive treatment planning.  Pt and OT discussed evaluation findings, pathology, POC and HEP.  Pt voiced understanding and performs HEP correctly without reported pain. Skilled Occupational Therapy is medically necessary to address the above impairments and reach functional goals.    OBJECTIVE:      Musculoskeletal  Observation: Unremarkable Edema          ROM and Strength  (* denotes performed with pain)  R Hand ROM   IF MF RF SF     MP 65 55 55 55     PIP 75 75 80 80     DIP 35 35 35 35     CASTILLO 175 165 170 170      Thumb ROM   MP Flex IP Flex Radial Abd Palmar Abd Opposition     Right 40 30 35 45 only to border of RF     Left 60 55 45 50 WNL      Hand Strength (lbs) R L      0 80     2 pt Pinch 2 22     3 pt Pinch 3 18     Lateral pinch 2 25       Flexibility:         Edema:  Circum Edema (cm) Elbow Crease Wrist Crease MCP Volumeter Other     Right    19 cm        22 cm (MCP's)      Left    18.3 cm        20.5 cm (MCP's)        Neurological:  Sensory: WNL       Light Touch Ten Test: WNL    ADLs/IADLs:  ADL's    Bathing: Modified Independent     Dressing: Modified Independent     Feeding:  Modified Independent     Grooming: Modified Independent  IADL's     Homemaking: Modified Independent     Food Prep: Modified Independent     Driving: Modified Independent   Other Functional Mobility/ADL Comments: Independent      Today's Treatment and Response:   Pt education was provided on exam findings, treatment diagnosis, treatment plan, expectations, and prognosis.    Today's Treatment       7/24/2025   OT Treatment   Therapeutic Exercise Wrist stretches  Tendon glides   Therapeutic Exercise Min 10   Eval Min 35   Total Timed Procedures 10   Total Service Procedures 45   Total Time 45         Patient was instructed in and issued a HEP for: Wrist stretches  Tendon glides  Contrast Baths  (R) Edema glove     Charges:  OT EVAL Low Complexity, 1 OT eval, 1 TE   Based on analysis of data from a problem-focused assessment from a brief chart review, clinical presentation of physical, cognitive and psychosocial skills, as well as review of patient rated outcome measures, this evaluation involved Low complexity decision making, with 1-3 occupational performance component deficits, no comorbidities, and no need for modification of tasks or assistance with assessment.                                                                                    PLAN OF CARE:      Goals: (to be met in 8 visits)   Patient will demonstrate improved right wrist flexion AROM by 5-10 degrees to support grooming and dressing tasks.  Patient will demonstrate improved right wrist extension AROM by 5-10 degrees to assist with reaching and lifting light objects during household tasks.  Patient will improve (R)  strength by 4-5 lbs to facilitate safe carrying of objects (e.g., grocery bags, pots).  Patient will improve(R) 2-point pinch strength by 2-3 lbs to support fine motor tasks such as buttoning and opening food packaging.  Patient will improve (R)  3-point pinch strength by 2-3 lbs to assist with handwriting or utensil use.  Patient  will improve (R) lateral pinch strength by 2-3 lbs to allow safe handling of keys, doorknobs, and light lifting.  Patient will decrease in right wrist edema by 0.2-0.3 cm to support improved ROM and dexterity.  Patient will be independent in final HEP to facilitate carryover of functional gains made in clinical therapy program.    Frequency / Duration: Patient will be seen 2 x week for 4 weeksx/week or a total of 8 visits over a 90 day period. Treatment will include: Manual Therapy; Neuromuscular Re-education; Self-Care Home Management; Electrical stimulation (unattended); Home Exercise Program instruction; Therapeutic Exercise; Therapeutic Activities    Education or treatment limitation: None   Rehab Potential: fair     QuickDASH Outcome Score  Score: (Patient-Rptd) 72.73 % (7/23/2025  1:13 PM)      Patient/Family/Caregiver was advised of these findings, precautions, and treatment options and has agreed to actively participate in planning and for this course of care.    Thank you for your referral. Please co-sign or sign and return this letter via fax as soon as possible to [phone]. If you have any questions, please contact me at Dept: [phone]    Sincerely,  Electronically signed by therapist: Earlene Renner OT  Physician's certification required: Yes  I certify the need for these services furnished under this plan of treatment and while under my care.    X___________________________________________________ Date____________________    Certification From: 7/24/2025  To: 10/22/2025

## 2025-07-25 RX ORDER — ZOLPIDEM TARTRATE 12.5 MG/1
12.5 TABLET, FILM COATED, EXTENDED RELEASE ORAL NIGHTLY PRN
Qty: 30 TABLET | Refills: 0 | Status: SHIPPED | OUTPATIENT
Start: 2025-07-25

## 2025-07-25 RX ORDER — ALPRAZOLAM 2 MG/1
2 TABLET ORAL NIGHTLY PRN
Qty: 30 TABLET | Refills: 0 | Status: SHIPPED | OUTPATIENT
Start: 2025-07-25

## 2025-07-25 NOTE — TELEPHONE ENCOUNTER
Please review; protocol failed/ has no protocol      Recent fills: 06/10/2025,05/03/2025,02/18/2025  Last Rx written: 06/10/2025  Last Office Visit: 10/17/2024    Recent Visits  Date Type Provider Dept   10/17/24 Office Visit Medhat Fontana, DO [facility]     Future Appointments  Date Type Provider Dept   08/28/25 Appointment Medhat Fontana,  [facility]   Showing future appointments within next 150 days with a meds authorizing provider and meeting all other requirements

## 2025-07-29 ENCOUNTER — OFFICE VISIT (OUTPATIENT)
Dept: PHYSICAL THERAPY | Age: 60
End: 2025-07-29
Attending: PHYSICIAN ASSISTANT
Payer: MEDICAID

## 2025-07-29 PROCEDURE — 97110 THERAPEUTIC EXERCISES: CPT

## 2025-07-29 PROCEDURE — 97140 MANUAL THERAPY 1/> REGIONS: CPT

## 2025-07-31 ENCOUNTER — OFFICE VISIT (OUTPATIENT)
Dept: PHYSICAL THERAPY | Age: 60
End: 2025-07-31
Attending: PHYSICIAN ASSISTANT
Payer: MEDICAID

## 2025-07-31 ENCOUNTER — OFFICE VISIT (OUTPATIENT)
Dept: FAMILY MEDICINE CLINIC | Facility: CLINIC | Age: 60
End: 2025-07-31

## 2025-07-31 VITALS
DIASTOLIC BLOOD PRESSURE: 70 MMHG | HEART RATE: 88 BPM | BODY MASS INDEX: 28 KG/M2 | OXYGEN SATURATION: 95 % | SYSTOLIC BLOOD PRESSURE: 110 MMHG | RESPIRATION RATE: 18 BRPM | TEMPERATURE: 98 F | WEIGHT: 202 LBS

## 2025-07-31 DIAGNOSIS — G89.29 CHRONIC HAND PAIN, RIGHT: ICD-10-CM

## 2025-07-31 DIAGNOSIS — E11.9 TYPE 2 DIABETES MELLITUS WITHOUT COMPLICATION, WITHOUT LONG-TERM CURRENT USE OF INSULIN (HCC): ICD-10-CM

## 2025-07-31 DIAGNOSIS — M79.641 CHRONIC HAND PAIN, RIGHT: ICD-10-CM

## 2025-07-31 DIAGNOSIS — Z28.21 TETANUS, DIPHTHERIA, AND ACELLULAR PERTUSSIS (TDAP) VACCINATION DECLINED: ICD-10-CM

## 2025-07-31 DIAGNOSIS — R29.898 WEAKNESS OF RIGHT ARM: Primary | ICD-10-CM

## 2025-07-31 DIAGNOSIS — Z28.21 PNEUMOCOCCAL VACCINATION DECLINED BY PATIENT: ICD-10-CM

## 2025-07-31 LAB
CREAT UR-SCNC: 322.6 MG/DL
HEMOGLOBIN A1C: 6.6 % (ref 4.3–5.6)
MICROALBUMIN UR-MCNC: 1 MG/DL
MICROALBUMIN/CREAT 24H UR-RTO: 3.1 UG/MG (ref ?–30)

## 2025-07-31 PROCEDURE — 97110 THERAPEUTIC EXERCISES: CPT

## 2025-07-31 PROCEDURE — 99214 OFFICE O/P EST MOD 30 MIN: CPT | Performed by: FAMILY MEDICINE

## 2025-07-31 PROCEDURE — 83036 HEMOGLOBIN GLYCOSYLATED A1C: CPT | Performed by: FAMILY MEDICINE

## 2025-07-31 PROCEDURE — 97112 NEUROMUSCULAR REEDUCATION: CPT

## 2025-08-01 ENCOUNTER — HOSPITAL ENCOUNTER (OUTPATIENT)
Dept: GENERAL RADIOLOGY | Age: 60
Discharge: HOME | End: 2025-08-01
Attending: FAMILY MEDICINE

## 2025-08-01 DIAGNOSIS — R29.898 WEAKNESS OF RIGHT ARM: ICD-10-CM

## 2025-08-01 PROCEDURE — 72050 X-RAY EXAM NECK SPINE 4/5VWS: CPT | Performed by: FAMILY MEDICINE

## 2025-08-05 ENCOUNTER — OFFICE VISIT (OUTPATIENT)
Dept: PHYSICAL THERAPY | Age: 60
End: 2025-08-05
Attending: PHYSICIAN ASSISTANT

## 2025-08-05 PROCEDURE — 97110 THERAPEUTIC EXERCISES: CPT

## 2025-08-07 ENCOUNTER — OFFICE VISIT (OUTPATIENT)
Dept: PHYSICAL THERAPY | Age: 60
End: 2025-08-07
Attending: PHYSICIAN ASSISTANT

## 2025-08-07 PROCEDURE — 97140 MANUAL THERAPY 1/> REGIONS: CPT

## 2025-08-07 PROCEDURE — 97110 THERAPEUTIC EXERCISES: CPT

## 2025-08-07 RX ORDER — ESCITALOPRAM OXALATE 10 MG/1
10 TABLET ORAL DAILY
Qty: 90 TABLET | Refills: 3 | Status: SHIPPED | OUTPATIENT
Start: 2025-08-07

## 2025-08-12 ENCOUNTER — OFFICE VISIT (OUTPATIENT)
Dept: PHYSICAL THERAPY | Age: 60
End: 2025-08-12
Attending: PHYSICIAN ASSISTANT

## 2025-08-12 PROCEDURE — 97110 THERAPEUTIC EXERCISES: CPT

## 2025-08-13 DIAGNOSIS — F41.1 GAD (GENERALIZED ANXIETY DISORDER): ICD-10-CM

## 2025-08-13 DIAGNOSIS — I10 ESSENTIAL HYPERTENSION WITH GOAL BLOOD PRESSURE LESS THAN 140/90: ICD-10-CM

## 2025-08-13 RX ORDER — AMLODIPINE BESYLATE 10 MG/1
10 TABLET ORAL DAILY
Qty: 90 TABLET | Refills: 3 | OUTPATIENT
Start: 2025-08-13

## 2025-08-14 ENCOUNTER — OFFICE VISIT (OUTPATIENT)
Dept: PHYSICAL THERAPY | Age: 60
End: 2025-08-14
Attending: PHYSICIAN ASSISTANT

## 2025-08-14 PROCEDURE — 97110 THERAPEUTIC EXERCISES: CPT

## 2025-08-15 ENCOUNTER — TELEPHONE (OUTPATIENT)
Dept: FAMILY MEDICINE CLINIC | Facility: CLINIC | Age: 60
End: 2025-08-15

## 2025-08-18 RX ORDER — ALPRAZOLAM 2 MG/1
2 TABLET ORAL NIGHTLY PRN
Qty: 30 TABLET | Refills: 0 | Status: SHIPPED | OUTPATIENT
Start: 2025-08-18

## 2025-08-28 ENCOUNTER — OFFICE VISIT (OUTPATIENT)
Dept: PHYSICAL MEDICINE AND REHAB | Facility: CLINIC | Age: 60
End: 2025-08-28

## 2025-08-28 VITALS — WEIGHT: 202 LBS | HEIGHT: 71 IN | BODY MASS INDEX: 28.28 KG/M2

## 2025-08-28 DIAGNOSIS — R29.898 RIGHT ARM WEAKNESS: Primary | ICD-10-CM

## 2025-08-28 PROCEDURE — 99204 OFFICE O/P NEW MOD 45 MIN: CPT | Performed by: PHYSICAL MEDICINE & REHABILITATION

## 2025-08-28 RX ORDER — GABAPENTIN 300 MG/1
CAPSULE ORAL
Qty: 60 CAPSULE | Refills: 0 | Status: SHIPPED | OUTPATIENT
Start: 2025-08-28

## (undated) DIAGNOSIS — F51.04 PSYCHOPHYSIOLOGICAL INSOMNIA: ICD-10-CM

## (undated) DIAGNOSIS — R35.0 URINARY FREQUENCY: Primary | ICD-10-CM

## (undated) DEVICE — 35 ML SYRINGE REGULAR TIP: Brand: MONOJECT

## (undated) DEVICE — ENDOSCOPY PACK - LOWER: Brand: MEDLINE INDUSTRIES, INC.

## (undated) DEVICE — TRAP 4 CPTR CHMBR N EZ INLN

## (undated) DEVICE — Device: Brand: DEFENDO AIR/WATER/SUCTION AND BIOPSY VALVE

## (undated) DEVICE — STERILE LATEX POWDER-FREE SURGICAL GLOVESWITH NITRILE COATING: Brand: PROTEXIS

## (undated) DEVICE — MEDI-VAC NON-CONDUCTIVE SUCTION TUBING 6MM X 1.8M (6FT.) L: Brand: CARDINAL HEALTH

## (undated) DEVICE — KIT ENDO ORCAPOD 160/180/190

## (undated) DEVICE — SNARE ENDOSCOPIC 10MM ROUND

## (undated) DEVICE — SNARE OPTMZ PLPCTM TRP

## (undated) DEVICE — SNARE CAPTIFLEX MICRO-OVL OLY

## (undated) DEVICE — KIT CLEAN ENDOKIT 1.1OZ GOWNX2

## (undated) DEVICE — LINE MNTR ADLT SET O2 INTMD

## (undated) NOTE — IP AVS SNAPSHOT
[facility]    [address], [place], [place] ~ [phone]                Discharge Summary   3/20/2017    Diana Allan           Admission Information        Provider Department    3/20/2017 Grace Salvador MD [facility] HEMORRHOIDS, UNDERSTANDING (ENGLISH)      Instructions and Information about Your Health     None      Immunization History as of 3/20/2017  Never Reviewed    No immunizations on file.       Radiology Exams     None         Additional Information       We

## (undated) NOTE — Clinical Note
1/30/2017          To Whom It May Concern:    Arnaldo Horn is currently under my medical care and was seen in clinic on 11/19/16 for an ER follow up. Please excuse his absence on 11/17/16 - 11/18/16.  He was cleared to return to work on 11/22/16 wit no rest

## (undated) NOTE — MR AVS SNAPSHOT
[address] [facility]  [address] [place] [place] [address]  [phone]               Thank you for choosing us for your health care visit with Frank Rivera MD.  We are glad to serve you and happy to provide you with this summary of your These medications were sent to [address], [facility] [address]    Hours:  24-hours Phone:  [phone]    - DiltiaZEM HCl ER Coated Beads 180 MG Cp24  - Na Sulf

## (undated) NOTE — LETTER
2021      REGARDIN [facility] [address]         To whom it may concern no other direct supervision regarding the above named:    I am the primary care provider for Mr. Gamaliel Black.   He has

## (undated) NOTE — LETTER
9/30/2024      REGARDING:        Gamaliel Maldonado        [address] [place] [address]         To whom it may concern as a pertains to the above-named employee/patient:    I am the primary care provider for Gamaliel Maldonado.  He is under my care for both chronic and acute medical challenges.  Unfortunately the patient was seen in the emergency room on September 18, 2024 regarding an acute exacerbation of an ongoing chronic medical problem.    This notification is being sent so that his direct supervision is aware that it has been recommended to him that he be off work with the status of being totally disabled until otherwise stated.  The patient will need specialty care and his current diagnosis cannot be disclosed on this format.    Please anticipate that he will be off at least 3 weeks beginning on this date of September 30, 2024.  Between the time of his emergency room assessment on September 18, 2024 and today's current date, if the patient has been off, then this letter also covers the need for him to be off with the same status of being totally disabled.    If you have any questions, please feel free to call.    Sincerely,    Medhat Fontana, DO          Document electronically generated by:  Medhat Fontana, DO

## (undated) NOTE — LETTER
Date & Time: 8/8/2022, 6:54 PM  Patient: Tan Maxwell  Encounter Provider(s):    On Adin Southward Attending  LISET Brown       To Whom It May Concern:    Lainey Yeh was seen and treated in our department on 8/8/2022. He is negative for COVID-19.     If you have any questions or concerns, please do not hesitate to call.        _____________________________  Physician/APC Signature

## (undated) NOTE — LETTER
2020      REGARDIN [facility] [address] [address]         To whom it may concern or the supervisor of the above named:    Mikeal David has been under my care and at home per my recommendation showing upper

## (undated) NOTE — LETTER
9/27/2021              [address]         Dear Sandrita Cartwright,    This letter is to inform you that our office has made several attempts to reach you by phone without success.   We were attempting to contact you

## (undated) NOTE — LETTER
7/12/2017              Aida Speaker        [address] [address]         To Whom it may concern:     This is to certify that Aida Speaker had an appointment on 7/12/2017 at 4:15 PM with Jerrod Tan MD.        Sincerely,    Grisel Cannon

## (undated) NOTE — LETTER
4/2/2017              Desiree Abrams        [address]  [address]         Dear  Israel Min,    Your recent colonoscopy exam at [facility] on March 20, 2017 showed 4 small- to medium- sized colon polyps which looked typical

## (undated) NOTE — MR AVS SNAPSHOT
[facility] - [facility]  [address], [address] [place]  [phone]               Thank you for choosing us for your health care visit with Carlton Haskins DO.   We are glad to serve you and happy to provide you with this sum Take 1 tablet (10 mg total) by mouth daily. Commonly known as:  NORVASC           colchicine 0.6 MG Tabs   Take 1 tablet (0.6 mg total) by mouth daily.            DilTIAZem HCl ER Coated Beads 180 MG Cp24   TAKE 1 CAPSULE BY MOUTH DAILY,   Commonly known drinks, candies and desserts   Eat plenty of low-fat dairy products High fat meats and dairy   Choose whole grain products Foods high in sodium   Water is best for hydration Fast food.    Eat at home when possible     Tips for increasing your physical activ

## (undated) NOTE — MR AVS SNAPSHOT
[facility] - [facility]  [address], [address] [place]  [phone]               Thank you for choosing us for your health care visit with Stephanie West DO.   We are glad to serve you and happy to provide you with this sum may be held responsible for payment in full if proper authorization is not acquired. Please contact the Patient Business Office at [phone] if you have any questions related to insurance coverage.          Reason for Today's Visit     Neck Pain medications prescribed for you. Read the directions carefully, and ask your doctor or other care provider to review them with you.             Results of Recent Testing     OSTEOPATHIC [facility]             Karen                  Visit EDWARD-EL

## (undated) NOTE — LETTER
3/30/2020          To Whom It May Concern:    Gordon Hernandez is currently under my medical care and may not return to work at this time. Please excuse Eddy Shelton from 3/27/2020 to 4/7/2020. He may return to work on 4/8/2020.   Activity is restricted as follo

## (undated) NOTE — LETTER
1/21/2020    [address] [address]            Dear Sivakumar Rosario,      Our records indicate that you are due for an appointment for a Colonoscopy in March 2020, or shortly there after, with Cindy Castellanos MD.

## (undated) NOTE — LETTER
Date & Time: 12/22/2023, 11:05 PM  Patient: Kirsten Pulido  Encounter Provider(s):    Nighat Collins MD       To Whom It May Concern:    Winter Vitale was seen and treated in our department on 12/22/2023. He can return to work.     If you have any questions or concerns, please do not hesitate to call.        _____________________________  Physician/APC Signature

## (undated) NOTE — LETTER
6/29/2018          To Whom It May Concern:    More Palomino is currently under my medical care and may not return to work at this time. Please excuse Dianelys Jade for 1 days. He may return to work on 07/05/2018  Activity is restricted as follows: none.     If y

## (undated) NOTE — LETTER
2021      [address] [facility] [address]         To whom it may concern or the human resource department for the above named:    Please be advised that I am the primary care provider for Mr. Itzel Newby